# Patient Record
Sex: MALE | Race: WHITE | NOT HISPANIC OR LATINO | Employment: FULL TIME | URBAN - METROPOLITAN AREA
[De-identification: names, ages, dates, MRNs, and addresses within clinical notes are randomized per-mention and may not be internally consistent; named-entity substitution may affect disease eponyms.]

---

## 2018-11-21 ENCOUNTER — OFFICE VISIT (OUTPATIENT)
Dept: FAMILY MEDICINE CLINIC | Facility: HOSPITAL | Age: 38
End: 2018-11-21
Payer: COMMERCIAL

## 2018-11-21 VITALS
SYSTOLIC BLOOD PRESSURE: 138 MMHG | HEART RATE: 86 BPM | BODY MASS INDEX: 41.35 KG/M2 | HEIGHT: 71 IN | TEMPERATURE: 97.6 F | WEIGHT: 295.4 LBS | DIASTOLIC BLOOD PRESSURE: 82 MMHG

## 2018-11-21 DIAGNOSIS — R03.0 ELEVATED BLOOD PRESSURE READING WITHOUT DIAGNOSIS OF HYPERTENSION: ICD-10-CM

## 2018-11-21 DIAGNOSIS — F98.8 ATTENTION DEFICIT DISORDER (ADD) WITHOUT HYPERACTIVITY: ICD-10-CM

## 2018-11-21 DIAGNOSIS — Z13.228 SCREENING FOR ENDOCRINE/METABOLIC/IMMUNITY DISORDERS: ICD-10-CM

## 2018-11-21 DIAGNOSIS — R94.31 PROLONGED Q-T INTERVAL ON ECG: ICD-10-CM

## 2018-11-21 DIAGNOSIS — J45.40 MODERATE PERSISTENT ASTHMA WITHOUT COMPLICATION: Primary | ICD-10-CM

## 2018-11-21 DIAGNOSIS — Z13.220 SCREENING CHOLESTEROL LEVEL: ICD-10-CM

## 2018-11-21 DIAGNOSIS — Z13.29 SCREENING FOR ENDOCRINE/METABOLIC/IMMUNITY DISORDERS: ICD-10-CM

## 2018-11-21 DIAGNOSIS — J30.1 NON-SEASONAL ALLERGIC RHINITIS DUE TO POLLEN: ICD-10-CM

## 2018-11-21 DIAGNOSIS — Z13.0 SCREENING FOR ENDOCRINE/METABOLIC/IMMUNITY DISORDERS: ICD-10-CM

## 2018-11-21 PROCEDURE — 99203 OFFICE O/P NEW LOW 30 MIN: CPT | Performed by: NURSE PRACTITIONER

## 2018-11-21 PROCEDURE — 3008F BODY MASS INDEX DOCD: CPT | Performed by: NURSE PRACTITIONER

## 2018-11-21 PROCEDURE — 1036F TOBACCO NON-USER: CPT | Performed by: NURSE PRACTITIONER

## 2018-11-21 RX ORDER — MONTELUKAST SODIUM 10 MG/1
10 TABLET ORAL
Qty: 30 TABLET | Refills: 1 | Status: SHIPPED | OUTPATIENT
Start: 2018-11-21 | End: 2019-01-14 | Stop reason: SDUPTHER

## 2018-11-21 RX ORDER — LISDEXAMFETAMINE DIMESYLATE 70 MG/1
70 CAPSULE ORAL DAILY
Refills: 0 | COMMUNITY
Start: 2018-11-14 | End: 2022-04-19 | Stop reason: SDUPTHER

## 2018-11-21 RX ORDER — ALBUTEROL SULFATE 2.5 MG/3ML
2.5 SOLUTION RESPIRATORY (INHALATION) EVERY 6 HOURS PRN
COMMUNITY
End: 2018-11-21 | Stop reason: CLARIF

## 2018-11-21 RX ORDER — ALBUTEROL SULFATE 90 UG/1
2 AEROSOL, METERED RESPIRATORY (INHALATION) EVERY 6 HOURS PRN
Qty: 1 INHALER | Refills: 1 | Status: SHIPPED | OUTPATIENT
Start: 2018-11-21 | End: 2019-08-10 | Stop reason: SDUPTHER

## 2018-11-21 NOTE — ASSESSMENT & PLAN NOTE
Not well controlled but has not been on Dulera  Will refill Dulera and albuterol  Also will add Singulair with significant allergies which are contributing  Can continue with Allegra but w/o the decongestant  F/U in 1 month

## 2018-11-21 NOTE — ASSESSMENT & PLAN NOTE
BP high normal range  Long discussion about lifestyle changes  Recommend reading labels and keeping sodium < 1200 mg/daily, exercise, lose weight  Recommend getting home BP cuff and checking BP twice daily and record and brig to next OV  Also should avoid daily decongestant use  Doubtful Vyvanse is causing elevated BP since he has been on for 4 years  Likely more lifestyle factors  Screening labs ordered  F/U in 1 month for BP check and lab review

## 2018-11-21 NOTE — PROGRESS NOTES
Assessment/Plan: Moderate persistent asthma without complication  Not well controlled but has not been on Dulera  Will refill Dulera and albuterol  Also will add Singulair with significant allergies which are contributing  Can continue with Allegra but w/o the decongestant  F/U in 1 month  Elevated blood pressure reading without diagnosis of hypertension  BP high normal range  Long discussion about lifestyle changes  Recommend reading labels and keeping sodium < 1200 mg/daily, exercise, lose weight  Recommend getting home BP cuff and checking BP twice daily and record and brig to next OV  Also should avoid daily decongestant use  Doubtful Vyvanse is causing elevated BP since he has been on for 4 years  Likely more lifestyle factors  Screening labs ordered  F/U in 1 month for BP check and lab review  Prolonged Q-T interval on ECG  Incidental finding of prolonged QT interval    Referral to cardiology  ECG and office note will be faxed to psychiatrist DR Marte  Attention deficit disorder (ADD) without hyperactivity  Managed by Dr Melba Gomez (psychiatrist)  Non-seasonal allergic rhinitis due to pollen  Longstanding h/o allergies with h/o allergy injections  Singulair started and should continue with Allegra but w/o decongestant component  Diagnoses and all orders for this visit:    Moderate persistent asthma without complication  -     albuterol (PROAIR HFA) 90 mcg/act inhaler; Inhale 2 puffs every 6 (six) hours as needed for wheezing  -     mometasone-formoterol (DULERA) 200-5 MCG/ACT inhaler; Inhale 2 puffs 2 (two) times a day Rinse mouth after use  -     montelukast (SINGULAIR) 10 mg tablet;  Take 1 tablet (10 mg total) by mouth daily at bedtime    Elevated blood pressure reading without diagnosis of hypertension    Attention deficit disorder (ADD) without hyperactivity    Non-seasonal allergic rhinitis due to pollen    Prolonged Q-T interval on ECG  - Ambulatory referral to Cardiology; Future    Screening for endocrine/metabolic/immunity disorders  -     CBC and differential; Future  -     Comprehensive metabolic panel; Future  -     TSH, 3rd generation with Free T4 reflex; Future    Screening cholesterol level  -     Lipid panel; Future    Other orders  -     VYVANSE 70 MG capsule; Take 70 mg by mouth daily  -     Discontinue: albuterol (2 5 mg/3 mL) 0 083 % nebulizer solution; Take 2 5 mg by nebulization every 6 (six) hours as needed for wheezing or shortness of breath  -     Discontinue: mometasone-formoterol (DULERA) 200-5 MCG/ACT inhaler; Inhale 2 puffs 2 (two) times a day Rinse mouth after use  Subjective:      Patient ID: Nghia Louise is a 41 y o  male  New pt  Treated by psychiatrist for ADD with Vyvanse  BP was elevated in office  140/92  Wanted him to have ECG and be seen by PCP  Denies h/o HTN and has never been treated before  Has been on Vyvanse for 4 years  Drinks 1 cup coffee/day  No other caffeine  States he does not eat enough  Does admit to processed foods but does cook for himself  Does not exercise  Rare alcohol use  Denies street drug use  Increase in stress at work  Denies family h/o of HTN  Denies chest pain, palpitations, sob, HA, dizziness, blurry or double vision  Has asthma  Asthma has been controlled  Previously seen by allergy/asthma specialist but does not want to return  Has allergies which contribute to asthma  Has been tested  Dust, cats, horses, trees  etc Got allergy shots and did not work  Recently asthma has been worse  Chest tightness  Thinks it is allergies  Typically this is a problem this time of year  Exercise will trigger asthma symptoms  Has been taking Allegra-D daily and states this helps with his chest tightness  Denies coughing and wheezing  Was on [place] but has stopped  Was on Singulair a long time ago  Asthma   There is no cough, shortness of breath or wheezing   Pertinent negatives include no chest pain or headaches  His past medical history is significant for asthma  The following portions of the patient's history were reviewed and updated as appropriate: allergies, current medications, past family history, past medical history, past social history, past surgical history and problem list     Review of Systems   Eyes: Negative for visual disturbance  Respiratory: Positive for chest tightness  Negative for cough, shortness of breath and wheezing  Cardiovascular: Negative for chest pain, palpitations and leg swelling  Neurological: Negative for dizziness, weakness, light-headedness, numbness and headaches  Objective:  Vitals:    11/21/18 0859   BP: 138/82   Pulse: 86   Temp: 97 6 °F (36 4 °C)      Physical Exam   Constitutional: He is oriented to person, place, and time  He appears well-developed and well-nourished  Cardiovascular: Normal rate, regular rhythm and normal heart sounds  No murmur heard  No carotid bruit B/L  Pulmonary/Chest: Effort normal and breath sounds normal    Neurological: He is alert and oriented to person, place, and time  Skin: Skin is warm and dry  Psychiatric: He has a normal mood and affect   His behavior is normal  Judgment and thought content normal

## 2018-11-21 NOTE — ASSESSMENT & PLAN NOTE
Incidental finding of prolonged QT interval    Referral to cardiology  ECG and office note will be faxed to psychiatrist DR Marte

## 2018-11-21 NOTE — ASSESSMENT & PLAN NOTE
Longstanding h/o allergies with h/o allergy injections  Singulair started and should continue with Allegra but w/o decongestant component

## 2018-12-19 ENCOUNTER — OFFICE VISIT (OUTPATIENT)
Dept: CARDIOLOGY CLINIC | Facility: CLINIC | Age: 38
End: 2018-12-19
Payer: COMMERCIAL

## 2018-12-19 VITALS
WEIGHT: 302 LBS | DIASTOLIC BLOOD PRESSURE: 80 MMHG | HEIGHT: 71 IN | BODY MASS INDEX: 42.28 KG/M2 | HEART RATE: 66 BPM | SYSTOLIC BLOOD PRESSURE: 120 MMHG

## 2018-12-19 DIAGNOSIS — R94.31 PROLONGED Q-T INTERVAL ON ECG: Primary | ICD-10-CM

## 2018-12-19 PROCEDURE — 99214 OFFICE O/P EST MOD 30 MIN: CPT | Performed by: STUDENT IN AN ORGANIZED HEALTH CARE EDUCATION/TRAINING PROGRAM

## 2018-12-19 RX ORDER — FEXOFENADINE HCL 180 MG/1
180 TABLET ORAL AS NEEDED
COMMUNITY
End: 2020-06-02

## 2018-12-19 NOTE — PROGRESS NOTES
Cardiology Consultation     [facility]  [id number]  1980  HEART & VASCULAR Bhavana Pretty  [facility]  [address]      No diagnosis found  Discussion/Summary: Mr Paul Kenny is a 61-year-old male with past medical history significant for asthma, attention deficit was referred by PCP for prolonged QTc    1  Prolonged QTc  - Incidentally noted on EKG- reviewed from Nov 2018- Normal sinus rhythm  Normal ST T wave  Mildly prolonged QTC of 484    - No further cardiac work-up, patient is asymptomatic and able to exercise with no limitation or symptoms  No family history of sudden cardiac death   - Monitor when starting on QT prolonging medications- antibiotics or psychiatric medications  - discussed to get an EKG in a year with PCP  - patient started on Lisdexamfetamine (Vyvanse) for ADD- not known to cause prolonged QTc- okay to use  2  Asthma      F/u as needed  History of Present Illness: Mr Paul Kenny is a 61-year-old male with past medical history significant for asthma, attention deficit was referred by PCP for prolonged QTC  Patient has no prior cardiac history of arrhythmias, heart failure or valvular disease  No prior history of syncope or palpitations  He is very functional at baseline  He denies any symptoms of chest pain or pressure  No shortness of breath at rest or with exertion  No palpitations  No dizziness or lightheadedness  No presyncope or syncope  Exercises once or twice a week on stationary bike and able to do it with no limitation  No family history of CAD, arrhythmias, sudden cardiac death in any of the family members at age less than 36 or history of drowning or seizures  Works as an   No smoking history  Social alcohol use  No drug use  EKG from November 21st reviewed-normal sinus rhythm  Normal ST T wave  Mildly prolonged QTC of 484       Patient Active Problem List   Diagnosis    Moderate persistent asthma without complication    Attention deficit disorder (ADD) without hyperactivity    Elevated blood pressure reading without diagnosis of hypertension    Non-seasonal allergic rhinitis due to pollen    Prolonged Q-T interval on ECG     Past Medical History:   Diagnosis Date    Allergic     Asthma      Social History     Social History    Marital status: Single     Spouse name: N/A    Number of children: N/A    Years of education: N/A     Occupational History    Not on file  Social History Main Topics    Smoking status: Never Smoker    Smokeless tobacco: Never Used    Alcohol use No    Drug use: No    Sexual activity: Not on file     Other Topics Concern    Not on file     Social History Narrative    No narrative on file      Family History   Problem Relation Age of Onset    Cancer Family     Arthritis Family      Past Surgical History:   Procedure Laterality Date    HIP SURGERY      KNEE SURGERY         Current Outpatient Prescriptions:     albuterol (PROAIR HFA) 90 mcg/act inhaler, Inhale 2 puffs every 6 (six) hours as needed for wheezing, Disp: 1 Inhaler, Rfl: 1    fexofenadine (ALLEGRA) 180 MG tablet, Take 180 mg by mouth as needed, Disp: , Rfl:     mometasone-formoterol (DULERA) 200-5 MCG/ACT inhaler, Inhale 2 puffs 2 (two) times a day Rinse mouth after use , Disp: 1 Inhaler, Rfl: 1    montelukast (SINGULAIR) 10 mg tablet, Take 1 tablet (10 mg total) by mouth daily at bedtime, Disp: 30 tablet, Rfl: 1    VYVANSE 70 MG capsule, Take 70 mg by mouth daily, Disp: , Rfl: 0  No Known Allergies      Labs:  No visits with results within 6 Month(s) from this visit  Latest known visit with results is:   No results found for any previous visit  Imaging: No results found  ECG: November 21st reviewed-normal sinus rhythm  Normal ST T wave  Mildly prolonged QTC of 484       Review of Systems:  Review of Systems   Constitutional: Negative for activity change, appetite change, chills, diaphoresis, fatigue and fever  HENT: Negative for congestion  Respiratory: Negative for cough, chest tightness, shortness of breath and wheezing  Cardiovascular: Negative for chest pain, palpitations and leg swelling  Gastrointestinal: Negative for abdominal pain, diarrhea, nausea and vomiting  Genitourinary: Negative for difficulty urinating and dysuria  Musculoskeletal: Negative for back pain  Skin: Negative for color change and rash  Neurological: Negative for dizziness, syncope, facial asymmetry, weakness, light-headedness, numbness and headaches  Psychiatric/Behavioral: Negative for confusion           Vitals:    12/19/18 1456   BP: 120/80   BP Location: Right arm   Patient Position: Sitting   Cuff Size: Large   Pulse: 66   Weight: (!) 137 kg (302 lb)   Height: 5' 11" (1 803 m)     Vitals:    12/19/18 1456   Weight: (!) 137 kg (302 lb)     Height: 5' 11" (180 3 cm)     Physical Exam:  General appearance:  Appears stated age, alert, well appearing and in no distress  HEENT:  PERRLA, EOMI, no scleral icterus, no conjunctival pallor  NECK:  Supple, No elevated JVP, no thyromegaly, no carotid bruits  HEART:  Regular rate and rhythm, normal S1/S2, no S3/S4, no murmur or rub  LUNGS:  Clear to auscultation bilaterally  ABDOMEN:  Soft, non-tender, positive bowel sounds, no rebound or guarding, no organomegaly   EXTREMITIES:  No edema  VASCULAR:  Normal pedal pulses   SKIN: No lesions or rashes on exposed skin  NEURO:  CN II-XII intact, no focal deficits

## 2018-12-19 NOTE — LETTER
December 19, 2018     [address]  [address] [address]    Patient: Alison Deleon   YOB: 1980   Date of Visit: 12/19/2018       Dear Dr Melissa Sheets: Thank you for referring Alison Deleon to me for evaluation  Below are my notes for this consultation  If you have questions, please do not hesitate to call me  I look forward to following your patient along with you  Sincerely,        Kat Mota MD        CC: No Recipients  Kat Mota MD  12/19/2018  4:32 PM  Sign at close encounter    Cardiology Consultation     Alison Deleon  [id number]  1980  [address]      No diagnosis found  Discussion/Summary: Mr Marcos Child is a 47-year-old male with past medical history significant for asthma, attention deficit was referred by PCP for prolonged QTc    1  Prolonged QTc  - Incidentally noted on EKG- reviewed from Nov 2018- Normal sinus rhythm  Normal ST T wave  Mildly prolonged QTC of 484    - No further cardiac work-up, patient is asymptomatic and able to exercise with no limitation or symptoms  No family history of sudden cardiac death   - Monitor when starting on QT prolonging medications- antibiotics or psychiatric medications  - discussed to get an EKG in a year with PCP  - patient started on Lisdexamfetamine (Vyvanse) for ADD- not known to cause prolonged QTc- okay to use  2  Asthma      F/u as needed  History of Present Illness: Mr Marcos Child is a 47-year-old male with past medical history significant for asthma, attention deficit was referred by PCP for prolonged QTC  Patient has no prior cardiac history of arrhythmias, heart failure or valvular disease  No prior history of syncope or palpitations  He is very functional at baseline  He denies any symptoms of chest pain or pressure  No shortness of breath at rest or with exertion  No palpitations  No dizziness or lightheadedness  No presyncope or syncope  Exercises once or twice a week on stationary bike and able to do it with no limitation  No family history of CAD, arrhythmias, sudden cardiac death in any of the family members at age less than 36 or history of drowning or seizures  Works as an   No smoking history  Social alcohol use  No drug use  EKG from November 21st reviewed-normal sinus rhythm  Normal ST T wave  Mildly prolonged QTC of 484  Patient Active Problem List   Diagnosis    Moderate persistent asthma without complication    Attention deficit disorder (ADD) without hyperactivity    Elevated blood pressure reading without diagnosis of hypertension    Non-seasonal allergic rhinitis due to pollen    Prolonged Q-T interval on ECG     Past Medical History:   Diagnosis Date    Allergic     Asthma      Social History     Social History    Marital status: Single     Spouse name: N/A    Number of children: N/A    Years of education: N/A     Occupational History    Not on file       Social History Main Topics    Smoking status: Never Smoker    Smokeless tobacco: Never Used    Alcohol use No    Drug use: No    Sexual activity: Not on file     Other Topics Concern    Not on file     Social History Narrative    No narrative on file      Family History   Problem Relation Age of Onset    Cancer Family     Arthritis Family      Past Surgical History:   Procedure Laterality Date    HIP SURGERY      KNEE SURGERY         Current Outpatient Prescriptions:     albuterol (PROAIR HFA) 90 mcg/act inhaler, Inhale 2 puffs every 6 (six) hours as needed for wheezing, Disp: 1 Inhaler, Rfl: 1    fexofenadine (ALLEGRA) 180 MG tablet, Take 180 mg by mouth as needed, Disp: , Rfl:     mometasone-formoterol (DULERA) 200-5 MCG/ACT inhaler, Inhale 2 puffs 2 (two) times a day Rinse mouth after use , Disp: 1 Inhaler, Rfl: 1    montelukast (SINGULAIR) 10 mg tablet, Take 1 tablet (10 mg total) by mouth daily at bedtime, Disp: 30 tablet, Rfl: 1    VYVANSE 70 MG capsule, Take 70 mg by mouth daily, Disp: , Rfl: 0  No Known Allergies      Labs:  No visits with results within 6 Month(s) from this visit  Latest known visit with results is:   No results found for any previous visit  Imaging: No results found  ECG: November 21st reviewed-normal sinus rhythm  Normal ST T wave  Mildly prolonged QTC of 484  Review of Systems:  Review of Systems   Constitutional: Negative for activity change, appetite change, chills, diaphoresis, fatigue and fever  HENT: Negative for congestion  Respiratory: Negative for cough, chest tightness, shortness of breath and wheezing  Cardiovascular: Negative for chest pain, palpitations and leg swelling  Gastrointestinal: Negative for abdominal pain, diarrhea, nausea and vomiting  Genitourinary: Negative for difficulty urinating and dysuria  Musculoskeletal: Negative for back pain  Skin: Negative for color change and rash  Neurological: Negative for dizziness, syncope, facial asymmetry, weakness, light-headedness, numbness and headaches  Psychiatric/Behavioral: Negative for confusion           Vitals:    12/19/18 1456   BP: 120/80   BP Location: Right arm   Patient Position: Sitting   Cuff Size: Large   Pulse: 66   Weight: (!) 137 kg (302 lb)   Height: 5' 11" (1 803 m)     Vitals:    12/19/18 1456   Weight: (!) 137 kg (302 lb)     Height: 5' 11" (180 3 cm)     Physical Exam:  General appearance:  Appears stated age, alert, well appearing and in no distress  HEENT:  PERRLA, EOMI, no scleral icterus, no conjunctival pallor  NECK:  Supple, No elevated JVP, no thyromegaly, no carotid bruits  HEART:  Regular rate and rhythm, normal S1/S2, no S3/S4, no murmur or rub  LUNGS:  Clear to auscultation bilaterally  ABDOMEN:  Soft, non-tender, positive bowel sounds, no rebound or guarding, no organomegaly   EXTREMITIES:  No edema  VASCULAR:  Normal pedal pulses   SKIN: No lesions or rashes on exposed skin  NEURO:  CN II-XII intact, no focal deficits

## 2018-12-19 NOTE — PATIENT INSTRUCTIONS
The QTc is very slightly prolonged, this should not cause to have any problems to you  No further cardiac workup needed    Follow-up as needed

## 2019-01-14 DIAGNOSIS — J45.40 MODERATE PERSISTENT ASTHMA WITHOUT COMPLICATION: ICD-10-CM

## 2019-01-14 RX ORDER — MONTELUKAST SODIUM 10 MG/1
TABLET ORAL
Qty: 30 TABLET | Refills: 0 | Status: SHIPPED | OUTPATIENT
Start: 2019-01-14 | End: 2019-03-11 | Stop reason: SDUPTHER

## 2019-01-14 RX ORDER — MOMETASONE FUROATE AND FORMOTEROL FUMARATE DIHYDRATE 200; 5 UG/1; UG/1
2 AEROSOL RESPIRATORY (INHALATION) 2 TIMES DAILY
Qty: 1 INHALER | Refills: 0 | Status: SHIPPED | OUTPATIENT
Start: 2019-01-14 | End: 2019-03-10 | Stop reason: SDUPTHER

## 2019-01-19 ENCOUNTER — APPOINTMENT (OUTPATIENT)
Dept: LAB | Facility: HOSPITAL | Age: 39
End: 2019-01-19
Payer: COMMERCIAL

## 2019-01-19 DIAGNOSIS — Z13.0 SCREENING FOR ENDOCRINE/METABOLIC/IMMUNITY DISORDERS: ICD-10-CM

## 2019-01-19 DIAGNOSIS — Z13.29 SCREENING FOR ENDOCRINE/METABOLIC/IMMUNITY DISORDERS: ICD-10-CM

## 2019-01-19 DIAGNOSIS — Z13.228 SCREENING FOR ENDOCRINE/METABOLIC/IMMUNITY DISORDERS: ICD-10-CM

## 2019-01-19 DIAGNOSIS — Z13.220 SCREENING CHOLESTEROL LEVEL: ICD-10-CM

## 2019-01-19 LAB
ALBUMIN SERPL BCP-MCNC: 3.7 G/DL (ref 3.5–5)
ALP SERPL-CCNC: 70 U/L (ref 46–116)
ALT SERPL W P-5'-P-CCNC: 28 U/L (ref 12–78)
ANION GAP SERPL CALCULATED.3IONS-SCNC: 7 MMOL/L (ref 4–13)
AST SERPL W P-5'-P-CCNC: 14 U/L (ref 5–45)
BASOPHILS # BLD AUTO: 0.05 THOUSANDS/ΜL (ref 0–0.1)
BASOPHILS NFR BLD AUTO: 1 % (ref 0–1)
BILIRUB SERPL-MCNC: 0.5 MG/DL (ref 0.2–1)
BUN SERPL-MCNC: 14 MG/DL (ref 5–25)
CALCIUM SERPL-MCNC: 8.8 MG/DL (ref 8.3–10.1)
CHLORIDE SERPL-SCNC: 104 MMOL/L (ref 100–108)
CHOLEST SERPL-MCNC: 163 MG/DL (ref 50–200)
CO2 SERPL-SCNC: 28 MMOL/L (ref 21–32)
CREAT SERPL-MCNC: 1.07 MG/DL (ref 0.6–1.3)
EOSINOPHIL # BLD AUTO: 0.12 THOUSAND/ΜL (ref 0–0.61)
EOSINOPHIL NFR BLD AUTO: 2 % (ref 0–6)
ERYTHROCYTE [DISTWIDTH] IN BLOOD BY AUTOMATED COUNT: 12.7 % (ref 11.6–15.1)
GFR SERPL CREATININE-BSD FRML MDRD: 89 ML/MIN/1.73SQ M
GLUCOSE P FAST SERPL-MCNC: 95 MG/DL (ref 65–99)
HCT VFR BLD AUTO: 44.5 % (ref 36.5–49.3)
HDLC SERPL-MCNC: 32 MG/DL (ref 40–60)
HGB BLD-MCNC: 15 G/DL (ref 12–17)
IMM GRANULOCYTES # BLD AUTO: 0.02 THOUSAND/UL (ref 0–0.2)
IMM GRANULOCYTES NFR BLD AUTO: 0 % (ref 0–2)
LDLC SERPL CALC-MCNC: 107 MG/DL (ref 0–100)
LYMPHOCYTES # BLD AUTO: 2.71 THOUSANDS/ΜL (ref 0.6–4.47)
LYMPHOCYTES NFR BLD AUTO: 34 % (ref 14–44)
MCH RBC QN AUTO: 31 PG (ref 26.8–34.3)
MCHC RBC AUTO-ENTMCNC: 33.7 G/DL (ref 31.4–37.4)
MCV RBC AUTO: 92 FL (ref 82–98)
MONOCYTES # BLD AUTO: 0.52 THOUSAND/ΜL (ref 0.17–1.22)
MONOCYTES NFR BLD AUTO: 7 % (ref 4–12)
NEUTROPHILS # BLD AUTO: 4.64 THOUSANDS/ΜL (ref 1.85–7.62)
NEUTS SEG NFR BLD AUTO: 56 % (ref 43–75)
NONHDLC SERPL-MCNC: 131 MG/DL
NRBC BLD AUTO-RTO: 0 /100 WBCS
PLATELET # BLD AUTO: 270 THOUSANDS/UL (ref 149–390)
PMV BLD AUTO: 10.3 FL (ref 8.9–12.7)
POTASSIUM SERPL-SCNC: 4.2 MMOL/L (ref 3.5–5.3)
PROT SERPL-MCNC: 7.7 G/DL (ref 6.4–8.2)
RBC # BLD AUTO: 4.84 MILLION/UL (ref 3.88–5.62)
SODIUM SERPL-SCNC: 139 MMOL/L (ref 136–145)
TRIGL SERPL-MCNC: 122 MG/DL
TSH SERPL DL<=0.05 MIU/L-ACNC: 1.55 UIU/ML (ref 0.36–3.74)
WBC # BLD AUTO: 8.06 THOUSAND/UL (ref 4.31–10.16)

## 2019-01-19 PROCEDURE — 80053 COMPREHEN METABOLIC PANEL: CPT

## 2019-01-19 PROCEDURE — 36415 COLL VENOUS BLD VENIPUNCTURE: CPT

## 2019-01-19 PROCEDURE — 85025 COMPLETE CBC W/AUTO DIFF WBC: CPT

## 2019-01-19 PROCEDURE — 80061 LIPID PANEL: CPT

## 2019-01-19 PROCEDURE — 84443 ASSAY THYROID STIM HORMONE: CPT

## 2019-02-05 ENCOUNTER — OFFICE VISIT (OUTPATIENT)
Dept: FAMILY MEDICINE CLINIC | Facility: HOSPITAL | Age: 39
End: 2019-02-05
Payer: COMMERCIAL

## 2019-02-05 VITALS
DIASTOLIC BLOOD PRESSURE: 78 MMHG | HEART RATE: 76 BPM | HEIGHT: 71 IN | SYSTOLIC BLOOD PRESSURE: 128 MMHG | WEIGHT: 307.4 LBS | TEMPERATURE: 97.5 F | BODY MASS INDEX: 43.03 KG/M2

## 2019-02-05 DIAGNOSIS — R03.0 ELEVATED BLOOD PRESSURE READING WITHOUT DIAGNOSIS OF HYPERTENSION: ICD-10-CM

## 2019-02-05 DIAGNOSIS — J30.1 NON-SEASONAL ALLERGIC RHINITIS DUE TO POLLEN: ICD-10-CM

## 2019-02-05 DIAGNOSIS — J45.40 MODERATE PERSISTENT ASTHMA WITHOUT COMPLICATION: Primary | ICD-10-CM

## 2019-02-05 DIAGNOSIS — E78.6 LOW HDL (UNDER 40): ICD-10-CM

## 2019-02-05 PROCEDURE — 3008F BODY MASS INDEX DOCD: CPT | Performed by: NURSE PRACTITIONER

## 2019-02-05 PROCEDURE — 99214 OFFICE O/P EST MOD 30 MIN: CPT | Performed by: NURSE PRACTITIONER

## 2019-02-05 PROCEDURE — 1036F TOBACCO NON-USER: CPT | Performed by: NURSE PRACTITIONER

## 2019-02-06 NOTE — PROGRESS NOTES
Assessment/Plan: Moderate persistent asthma without complication  Well controlled on Dulera and Singulair  Can continue this regimen  Can back off on Dulera slowly but should restart with any flare in asthma symptoms  F/U in 6 months  Non-seasonal allergic rhinitis due to pollen  Continue on Singulair  Well controlled  Elevated blood pressure reading without diagnosis of hypertension  Last 2 BP readings have been well controlled  Reviewed lab work with pt  Low HDL (under 40)  HDL low with normal LDL and TG  Discussed increasing intake of omega-3 rich foods and increasing activity level  Recheck yearly  Diagnoses and all orders for this visit:    Moderate persistent asthma without complication    Elevated blood pressure reading without diagnosis of hypertension    Non-seasonal allergic rhinitis due to pollen    Low HDL (under 40)          Subjective:      Patient ID: Silvana Oneill is a 41 y o  male  Asthma has been well controlled  Currently with cold and does not feel asthma symptoms are bad  Denies cough, sob and wheezing  Taking Dulera and Singulair daily  Feels Singulair has made a big difference  Thinks allergies are worst in the fall  The following portions of the patient's history were reviewed and updated as appropriate: allergies, current medications, past family history, past medical history, past social history, past surgical history and problem list     Review of Systems   Eyes: Negative for visual disturbance  Respiratory: Negative for cough, shortness of breath and wheezing  Cardiovascular: Negative for chest pain and palpitations  Neurological: Negative for dizziness and headaches  Objective:  Vitals:    02/05/19 1905   BP: 128/78   Pulse: 76   Temp: 97 5 °F (36 4 °C)      Physical Exam   Constitutional: He is oriented to person, place, and time  He appears well-developed and well-nourished     Cardiovascular: Normal rate, regular rhythm and normal heart sounds  No murmur heard  Pulses:       Carotid pulses are 2+ on the right side, and 2+ on the left side  No carotid bruit B/L  Pulmonary/Chest: Effort normal and breath sounds normal    Neurological: He is alert and oriented to person, place, and time  Skin: Skin is warm and dry  Psychiatric: He has a normal mood and affect   His behavior is normal  Judgment and thought content normal

## 2019-02-06 NOTE — ASSESSMENT & PLAN NOTE
Well controlled on Dulera and Singulair  Can continue this regimen  Can back off on Dulera slowly but should restart with any flare in asthma symptoms  F/U in 6 months

## 2019-02-06 NOTE — ASSESSMENT & PLAN NOTE
HDL low with normal LDL and TG  Discussed increasing intake of omega-3 rich foods and increasing activity level  Recheck yearly

## 2019-03-10 DIAGNOSIS — J45.40 MODERATE PERSISTENT ASTHMA WITHOUT COMPLICATION: ICD-10-CM

## 2019-03-10 RX ORDER — MOMETASONE FUROATE AND FORMOTEROL FUMARATE DIHYDRATE 200; 5 UG/1; UG/1
2 AEROSOL RESPIRATORY (INHALATION) 2 TIMES DAILY
Qty: 3 INHALER | Refills: 1 | Status: SHIPPED | OUTPATIENT
Start: 2019-03-10 | End: 2019-10-21 | Stop reason: ALTCHOICE

## 2019-03-11 DIAGNOSIS — J45.40 MODERATE PERSISTENT ASTHMA WITHOUT COMPLICATION: ICD-10-CM

## 2019-03-12 RX ORDER — MONTELUKAST SODIUM 10 MG/1
TABLET ORAL
Qty: 90 TABLET | Refills: 1 | Status: SHIPPED | OUTPATIENT
Start: 2019-03-12 | End: 2019-10-07 | Stop reason: SDUPTHER

## 2019-04-12 ENCOUNTER — OFFICE VISIT (OUTPATIENT)
Dept: FAMILY MEDICINE CLINIC | Facility: HOSPITAL | Age: 39
End: 2019-04-12
Payer: COMMERCIAL

## 2019-04-12 VITALS
BODY MASS INDEX: 42.84 KG/M2 | WEIGHT: 306 LBS | SYSTOLIC BLOOD PRESSURE: 140 MMHG | HEIGHT: 71 IN | TEMPERATURE: 97.8 F | DIASTOLIC BLOOD PRESSURE: 88 MMHG | HEART RATE: 81 BPM

## 2019-04-12 DIAGNOSIS — J45.40 MODERATE PERSISTENT ASTHMA WITHOUT COMPLICATION: ICD-10-CM

## 2019-04-12 DIAGNOSIS — J06.9 UPPER RESPIRATORY TRACT INFECTION, UNSPECIFIED TYPE: Primary | ICD-10-CM

## 2019-04-12 PROCEDURE — 99214 OFFICE O/P EST MOD 30 MIN: CPT | Performed by: INTERNAL MEDICINE

## 2019-04-12 PROCEDURE — 1036F TOBACCO NON-USER: CPT | Performed by: INTERNAL MEDICINE

## 2019-04-12 PROCEDURE — 3008F BODY MASS INDEX DOCD: CPT | Performed by: INTERNAL MEDICINE

## 2019-04-12 RX ORDER — FLUTICASONE PROPIONATE 50 MCG
2 SPRAY, SUSPENSION (ML) NASAL DAILY
Qty: 1 BOTTLE | Refills: 1 | Status: SHIPPED | OUTPATIENT
Start: 2019-04-12 | End: 2020-07-16 | Stop reason: ALTCHOICE

## 2019-08-06 ENCOUNTER — OFFICE VISIT (OUTPATIENT)
Dept: FAMILY MEDICINE CLINIC | Facility: HOSPITAL | Age: 39
End: 2019-08-06
Payer: COMMERCIAL

## 2019-08-06 VITALS
DIASTOLIC BLOOD PRESSURE: 76 MMHG | TEMPERATURE: 97.8 F | HEIGHT: 71 IN | WEIGHT: 304.2 LBS | SYSTOLIC BLOOD PRESSURE: 126 MMHG | HEART RATE: 84 BPM | BODY MASS INDEX: 42.59 KG/M2

## 2019-08-06 DIAGNOSIS — E78.6 LOW HDL (UNDER 40): ICD-10-CM

## 2019-08-06 DIAGNOSIS — R06.83 SNORING: ICD-10-CM

## 2019-08-06 DIAGNOSIS — J45.40 MODERATE PERSISTENT ASTHMA WITHOUT COMPLICATION: Primary | ICD-10-CM

## 2019-08-06 DIAGNOSIS — E66.01 MORBID OBESITY WITH BMI OF 40.0-44.9, ADULT (HCC): ICD-10-CM

## 2019-08-06 DIAGNOSIS — R40.0 DAYTIME SLEEPINESS: ICD-10-CM

## 2019-08-06 DIAGNOSIS — Z13.1 SCREENING FOR DIABETES MELLITUS: ICD-10-CM

## 2019-08-06 PROCEDURE — 1036F TOBACCO NON-USER: CPT | Performed by: NURSE PRACTITIONER

## 2019-08-06 PROCEDURE — 3008F BODY MASS INDEX DOCD: CPT | Performed by: NURSE PRACTITIONER

## 2019-08-06 PROCEDURE — 99214 OFFICE O/P EST MOD 30 MIN: CPT | Performed by: NURSE PRACTITIONER

## 2019-08-06 NOTE — ASSESSMENT & PLAN NOTE
Well controlled off daily inhaler  Should have daily inhaler on hand  Advised pt to have insurance refax the suggested inhalers to this office  F/U in 6 months  Call with any increase in sob, wheezing, cough or albuterol use

## 2019-08-06 NOTE — PATIENT INSTRUCTIONS
Obesity   AMBULATORY CARE:   Obesity  is when your body mass index (BMI) is greater than 30  Your healthcare provider will use your height and weight to measure your BMI  The risks of obesity include  many health problems, such as injuries or physical disability  You may need tests to check for the following:  · Diabetes     · High blood pressure or high cholesterol     · Heart disease     · Gallbladder or liver disease     · Cancer of the colon, breast, prostate, liver, or kidney     · Sleep apnea     · Arthritis or gout  Seek care immediately if:   · You have a severe headache, confusion, or difficulty speaking  · You have weakness on one side of your body  · You have chest pain, sweating, or shortness of breath  Contact your healthcare provider if:   · You have symptoms of gallbladder or liver disease, such as pain in your upper abdomen  · You have knee or hip pain and discomfort while walking  · You have symptoms of diabetes, such as intense hunger and thirst, and frequent urination  · You have symptoms of sleep apnea, such as snoring or daytime sleepiness  · You have questions or concerns about your condition or care  Treatment for obesity  focuses on helping you lose weight to improve your health  Even a small decrease in BMI can reduce the risk for many health problems  Your healthcare provider will help you set a weight-loss goal   · Lifestyle changes  are the first step in treating obesity  These include making healthy food choices and getting regular physical activity  Your healthcare provider may suggest a weight-loss program that involves coaching, education, and therapy  · Medicine  may help you lose weight when it is used with a healthy diet and physical activity  · Surgery  can help you lose weight if you are very obese and have other health problems  There are several types of weight-loss surgery  Ask your healthcare provider for more information    Be successful losing weight:   · Set small, realistic goals  An example of a small goal is to walk for 20 minutes 5 days a week  Anther goal is to lose 5% of your body weight  · Tell friends, family members, and coworkers about your goals  and ask for their support  Ask a friend to lose weight with you, or join a weight-loss support group  · Identify foods or triggers that may cause you to overeat , and find ways to avoid them  Remove tempting high-calorie foods from your home and workplace  Place a bowl of fresh fruit on your kitchen counter  If stress causes you to eat, then find other ways to cope with stress  · Keep a diary to track what you eat and drink  Also write down how many minutes of physical activity you do each day  Weigh yourself once a week and record it in your diary  Eating changes: You will need to eat 500 to 1,000 fewer calories each day than you currently eat to lose 1 to 2 pounds a week  The following changes will help you cut calories:  · Eat smaller portions  Use small plates, no larger than 9 inches in diameter  Fill your plate half full of fruits and vegetables  Measure your food using measuring cups until you know what a serving size looks like  · Eat 3 meals and 1 or 2 snacks each day  Plan your meals in advance  Dariela Matt and eat at home most of the time  Eat slowly  · Eat fruits and vegetables at every meal   They are low in calories and high in fiber, which makes you feel full  Do not add butter, margarine, or cream sauce to vegetables  Use herbs to season steamed vegetables  · Eat less fat and fewer fried foods  Eat more baked or grilled chicken and fish  These protein sources are lower in calories and fat than red meat  Limit fast food  Dress your salads with olive oil and vinegar instead of bottled dressing  · Limit the amount of sugar you eat  Do not drink sugary beverages  Limit alcohol  Activity changes:  Physical activity is good for your body in many ways   It helps you burn calories and build strong muscles  It decreases stress and depression, and improves your mood  It can also help you sleep better  Talk to your healthcare provider before you begin an exercise program   · Exercise for at least 30 minutes 5 days a week  Start slowly  Set aside time each day for physical activity that you enjoy and that is convenient for you  It is best to do both weight training and an activity that increases your heart rate, such as walking, bicycling, or swimming  · Find ways to be more active  Do yard work and housecleaning  Walk up the stairs instead of using elevators  Spend your leisure time going to events that require walking, such as outdoor festivals or fairs  This extra physical activity can help you lose weight and keep it off  Follow up with your healthcare provider as directed: You may need to meet with a dietitian  Write down your questions so you remember to ask them during your visits  © 2017 [employer] Information is for End User's use only and may not be sold, redistributed or otherwise used for commercial purposes  All illustrations and images included in CareNotes® are the copyrighted property of [employer]  or [employer]ugio Santos  The above information is an  only  It is not intended as medical advice for individual conditions or treatments  Talk to your doctor, nurse or pharmacist before following any medical regimen to see if it is safe and effective for you

## 2019-08-06 NOTE — PROGRESS NOTES
Assessment/Plan: Moderate persistent asthma without complication  Well controlled off daily inhaler  Should have daily inhaler on hand  Advised pt to have insurance refax the suggested inhalers to this office  F/U in 6 months  Call with any increase in sob, wheezing, cough or albuterol use  Low HDL (under 40)  Recheck FLP in 6 months  Diagnoses and all orders for this visit:    Moderate persistent asthma without complication    Low HDL (under 40)  -     CBC and differential; Future  -     Comprehensive metabolic panel; Future  -     Lipid panel; Future    Screening for diabetes mellitus  -     Hemoglobin A1C; Future    Snoring  Comments:  Referral to sleep medicine  Orders:  -     Ambulatory referral to Sleep Medicine; Future    Daytime sleepiness  -     Ambulatory referral to Sleep Medicine; Future          Subjective:      Patient ID: Sherryle Garner is a 41 y o  male  Off [place]  Had to stop because he changed insurance and they did not cover Jose Roberto Endo  Breathing has been good  Did have to use albuterol twice last week due to allergies  States breathing much improved with Singulair  States he is told he snores  Has been feeling less rested and more tired despite adequate sleep  Has been going on for years  Denies report of apnea or known apnea  The following portions of the patient's history were reviewed and updated as appropriate: allergies, current medications, past family history, past medical history, past social history, past surgical history and problem list     Review of Systems   Respiratory: Negative for cough, chest tightness, shortness of breath and wheezing  Cardiovascular: Negative for chest pain  Objective:  Vitals:    08/06/19 1853   BP: 126/76   Pulse: 84   Temp: 97 8 °F (36 6 °C)      Physical Exam   Constitutional: He is oriented to person, place, and time  He appears well-developed and well-nourished     HENT:   Mouth/Throat: Oropharynx is clear and moist  Oropharynx non crowded  Cardiovascular: Normal rate, regular rhythm and normal heart sounds  No murmur heard  Pulmonary/Chest: Effort normal and breath sounds normal    Neck circumference 46 5 cm   Neurological: He is alert and oriented to person, place, and time  Skin: Skin is warm and dry  Capillary refill takes less than 2 seconds  Psychiatric: He has a normal mood and affect  BMI Counseling: Body mass index is 42 43 kg/m²  Discussed the patient's BMI with him  The BMI is above average  BMI counseling and education was provided to the patient  Nutrition recommendations include reducing portion sizes, decreasing overall calorie intake, consuming healthier snacks, decreasing soda and/or juice intake, moderation in carbohydrate intake and increasing intake of lean protein  Exercise recommendations include moderate aerobic physical activity for 150 minutes/week

## 2019-08-10 DIAGNOSIS — J45.40 MODERATE PERSISTENT ASTHMA WITHOUT COMPLICATION: ICD-10-CM

## 2019-08-11 RX ORDER — ALBUTEROL SULFATE 90 UG/1
AEROSOL, METERED RESPIRATORY (INHALATION)
Qty: 8.5 INHALER | Refills: 1 | Status: SHIPPED | OUTPATIENT
Start: 2019-08-11 | End: 2019-12-14 | Stop reason: SDUPTHER

## 2019-10-07 DIAGNOSIS — J45.40 MODERATE PERSISTENT ASTHMA WITHOUT COMPLICATION: ICD-10-CM

## 2019-10-07 RX ORDER — MONTELUKAST SODIUM 10 MG/1
TABLET ORAL
Qty: 90 TABLET | Refills: 1 | Status: SHIPPED | OUTPATIENT
Start: 2019-10-07 | End: 2020-09-01

## 2019-10-21 ENCOUNTER — TELEPHONE (OUTPATIENT)
Dept: FAMILY MEDICINE CLINIC | Facility: HOSPITAL | Age: 39
End: 2019-10-21

## 2019-10-21 DIAGNOSIS — J45.40 MODERATE PERSISTENT ASTHMA WITHOUT COMPLICATION: Primary | ICD-10-CM

## 2019-10-21 RX ORDER — BUDESONIDE AND FORMOTEROL FUMARATE DIHYDRATE 80; 4.5 UG/1; UG/1
2 AEROSOL RESPIRATORY (INHALATION) 2 TIMES DAILY
Qty: 1 INHALER | Refills: 3 | Status: SHIPPED | OUTPATIENT
Start: 2019-10-21 | End: 2020-03-04

## 2019-12-14 DIAGNOSIS — J45.40 MODERATE PERSISTENT ASTHMA WITHOUT COMPLICATION: ICD-10-CM

## 2019-12-15 RX ORDER — ALBUTEROL SULFATE 90 UG/1
AEROSOL, METERED RESPIRATORY (INHALATION)
Qty: 8.5 INHALER | Refills: 1 | Status: SHIPPED | OUTPATIENT
Start: 2019-12-15 | End: 2020-07-16 | Stop reason: SDUPTHER

## 2020-03-04 DIAGNOSIS — J45.40 MODERATE PERSISTENT ASTHMA WITHOUT COMPLICATION: ICD-10-CM

## 2020-03-04 RX ORDER — DILTIAZEM HYDROCHLORIDE 60 MG/1
TABLET, FILM COATED ORAL
Qty: 10.2 INHALER | Refills: 0 | Status: SHIPPED | OUTPATIENT
Start: 2020-03-04 | End: 2020-04-05

## 2020-04-04 DIAGNOSIS — J45.40 MODERATE PERSISTENT ASTHMA WITHOUT COMPLICATION: ICD-10-CM

## 2020-04-05 RX ORDER — DILTIAZEM HYDROCHLORIDE 60 MG/1
TABLET, FILM COATED ORAL
Qty: 10.2 INHALER | Refills: 0 | Status: SHIPPED | OUTPATIENT
Start: 2020-04-05 | End: 2020-05-04

## 2020-05-04 DIAGNOSIS — J45.40 MODERATE PERSISTENT ASTHMA WITHOUT COMPLICATION: ICD-10-CM

## 2020-05-04 RX ORDER — DILTIAZEM HYDROCHLORIDE 60 MG/1
TABLET, FILM COATED ORAL
Qty: 10.2 INHALER | Refills: 0 | Status: SHIPPED | OUTPATIENT
Start: 2020-05-04 | End: 2022-04-12 | Stop reason: SDUPTHER

## 2020-06-01 ENCOUNTER — TELEPHONE (OUTPATIENT)
Dept: OBGYN CLINIC | Facility: HOSPITAL | Age: 40
End: 2020-06-01

## 2020-06-01 DIAGNOSIS — J45.40 MODERATE PERSISTENT ASTHMA WITHOUT COMPLICATION: ICD-10-CM

## 2020-06-01 RX ORDER — DILTIAZEM HYDROCHLORIDE 60 MG/1
TABLET, FILM COATED ORAL
Qty: 10.2 INHALER | Refills: 0 | OUTPATIENT
Start: 2020-06-01

## 2020-06-01 NOTE — TELEPHONE ENCOUNTER
Pt was told in March that I would not refill inhaler until he is seen  He was supposed to call back  He needs appointment and to be seen for further refills

## 2020-06-02 ENCOUNTER — OFFICE VISIT (OUTPATIENT)
Dept: OBGYN CLINIC | Facility: CLINIC | Age: 40
End: 2020-06-02
Payer: COMMERCIAL

## 2020-06-02 ENCOUNTER — APPOINTMENT (OUTPATIENT)
Dept: RADIOLOGY | Facility: CLINIC | Age: 40
End: 2020-06-02
Payer: COMMERCIAL

## 2020-06-02 VITALS
SYSTOLIC BLOOD PRESSURE: 168 MMHG | BODY MASS INDEX: 42.84 KG/M2 | DIASTOLIC BLOOD PRESSURE: 98 MMHG | HEART RATE: 106 BPM | HEIGHT: 71 IN | WEIGHT: 306 LBS

## 2020-06-02 DIAGNOSIS — M16.11 PRIMARY OSTEOARTHRITIS OF RIGHT HIP: Primary | ICD-10-CM

## 2020-06-02 DIAGNOSIS — M25.551 PAIN IN RIGHT HIP: ICD-10-CM

## 2020-06-02 PROCEDURE — 99203 OFFICE O/P NEW LOW 30 MIN: CPT | Performed by: ORTHOPAEDIC SURGERY

## 2020-06-02 PROCEDURE — 3008F BODY MASS INDEX DOCD: CPT | Performed by: ORTHOPAEDIC SURGERY

## 2020-06-02 PROCEDURE — 1036F TOBACCO NON-USER: CPT | Performed by: ORTHOPAEDIC SURGERY

## 2020-06-02 PROCEDURE — 73502 X-RAY EXAM HIP UNI 2-3 VIEWS: CPT

## 2020-06-02 RX ORDER — MELOXICAM 15 MG/1
15 TABLET ORAL DAILY
Qty: 30 TABLET | Refills: 0 | Status: SHIPPED | OUTPATIENT
Start: 2020-06-02 | End: 2020-06-29

## 2020-06-04 ENCOUNTER — TELEPHONE (OUTPATIENT)
Dept: RADIOLOGY | Facility: CLINIC | Age: 40
End: 2020-06-04

## 2020-06-04 DIAGNOSIS — M16.11 PRIMARY OSTEOARTHRITIS OF ONE HIP, RIGHT: Primary | ICD-10-CM

## 2020-06-29 DIAGNOSIS — M16.11 PRIMARY OSTEOARTHRITIS OF RIGHT HIP: ICD-10-CM

## 2020-06-29 RX ORDER — MELOXICAM 15 MG/1
TABLET ORAL
Qty: 30 TABLET | Refills: 0 | Status: SHIPPED | OUTPATIENT
Start: 2020-06-29 | End: 2022-04-07 | Stop reason: ALTCHOICE

## 2020-07-13 ENCOUNTER — HOSPITAL ENCOUNTER (OUTPATIENT)
Dept: RADIOLOGY | Facility: CLINIC | Age: 40
Discharge: HOME | End: 2020-07-13
Attending: ANESTHESIOLOGY
Payer: COMMERCIAL

## 2020-07-13 VITALS
SYSTOLIC BLOOD PRESSURE: 129 MMHG | DIASTOLIC BLOOD PRESSURE: 77 MMHG | OXYGEN SATURATION: 96 % | TEMPERATURE: 98.1 F | HEART RATE: 86 BPM | RESPIRATION RATE: 20 BRPM

## 2020-07-13 DIAGNOSIS — M16.11 PRIMARY OSTEOARTHRITIS OF ONE HIP, RIGHT: ICD-10-CM

## 2020-07-13 PROCEDURE — 77002 NEEDLE LOCALIZATION BY XRAY: CPT

## 2020-07-13 PROCEDURE — 20610 DRAIN/INJ JOINT/BURSA W/O US: CPT | Performed by: ANESTHESIOLOGY

## 2020-07-13 PROCEDURE — 77002 NEEDLE LOCALIZATION BY XRAY: CPT | Performed by: ANESTHESIOLOGY

## 2020-07-13 RX ORDER — LIDOCAINE HYDROCHLORIDE 10 MG/ML
5 INJECTION, SOLUTION EPIDURAL; INFILTRATION; INTRACAUDAL; PERINEURAL ONCE
Status: COMPLETED | OUTPATIENT
Start: 2020-07-13 | End: 2020-07-13

## 2020-07-13 RX ORDER — METHYLPREDNISOLONE ACETATE 80 MG/ML
80 INJECTION, SUSPENSION INTRA-ARTICULAR; INTRALESIONAL; INTRAMUSCULAR; PARENTERAL; SOFT TISSUE ONCE
Status: COMPLETED | OUTPATIENT
Start: 2020-07-13 | End: 2020-07-13

## 2020-07-13 RX ADMIN — LIDOCAINE HYDROCHLORIDE 3 ML: 10 INJECTION, SOLUTION EPIDURAL; INFILTRATION; INTRACAUDAL; PERINEURAL at 09:15

## 2020-07-13 RX ADMIN — IOHEXOL 1 ML: 300 INJECTION, SOLUTION INTRAVENOUS at 09:17

## 2020-07-13 RX ADMIN — LIDOCAINE HYDROCHLORIDE 2 ML: 20 INJECTION, SOLUTION EPIDURAL; INFILTRATION; INTRACAUDAL at 09:18

## 2020-07-13 RX ADMIN — METHYLPREDNISOLONE ACETATE 80 MG: 80 INJECTION, SUSPENSION INTRA-ARTICULAR; INTRALESIONAL; INTRAMUSCULAR; SOFT TISSUE at 09:20

## 2020-07-13 NOTE — H&P
History of Present Illness: The patient is a 42 y o  male who presents with complaints of right hip pain      Patient Active Problem List   Diagnosis    Moderate persistent asthma without complication    Attention deficit disorder (ADD) without hyperactivity    Elevated blood pressure reading without diagnosis of hypertension    Non-seasonal allergic rhinitis due to pollen    Prolonged Q-T interval on ECG    Low HDL (under 40)    Primary osteoarthritis of right hip       Past Medical History:   Diagnosis Date    Allergic        Past Surgical History:   Procedure Laterality Date    HIP SURGERY      KNEE SURGERY           Current Outpatient Medications:     albuterol (PROVENTIL HFA,VENTOLIN HFA) 90 mcg/act inhaler, TAKE 2 PUFFS BY MOUTH EVERY 6 HOURS AS NEEDED FOR WHEEZE, Disp: 8 5 Inhaler, Rfl: 1    fluticasone (FLONASE) 50 mcg/act nasal spray, 2 sprays into each nostril daily, Disp: 1 Bottle, Rfl: 1    meloxicam (MOBIC) 15 mg tablet, TAKE 1 TABLET BY MOUTH EVERY DAY, Disp: 30 tablet, Rfl: 0    montelukast (SINGULAIR) 10 mg tablet, TAKE 1 TABLET BY MOUTH EVERYDAY AT BEDTIME, Disp: 90 tablet, Rfl: 1    SYMBICORT 80-4 5 MCG/ACT inhaler, INHALE 2 PUFFS 2 TIMES A DAY RINSE MOUTH AFTER USE , Disp: 10 2 Inhaler, Rfl: 0    VYVANSE 70 MG capsule, Take 70 mg by mouth daily, Disp: , Rfl: 0    Current Facility-Administered Medications:     iohexol (OMNIPAQUE) 300 mg/mL injection 50 mL, 50 mL, Intra-articular, Once, Brennan Philip DO    lidocaine (PF) (XYLOCAINE-MPF) 1 % injection 5 mL, 5 mL, Other, Once, Brennan Philip DO    lidocaine (PF) (XYLOCAINE-MPF) 2 % injection 5 mL, 5 mL, Intra-articular, Once, Brennan Philip DO    methylPREDNISolone acetate (DEPO-MEDROL) injection 80 mg, 80 mg, Intra-articular, Once, Brennan Philip DO    No Known Allergies    Physical Exam:   Vitals:    07/13/20 0903   BP: 122/76   Pulse: 84   Resp: 22   Temp: 98 1 °F (36 7 °C)   SpO2: 98%     General: Awake, Alert, Oriented x 3, Mood and affect appropriate  Respiratory: Respirations even and unlabored  Cardiovascular: Peripheral pulses intact; no edema  Musculoskeletal Exam:  Decreased range of motion right hip    ASA Score: II    Patient/Chart Verification  Patient ID Verified: Verbal  ID Band Applied: No  Consents Confirmed: Procedural  H&P( within 30 days) Verified: To be obtained in the Pre-Procedure area  Interval H&P(within 24 hr) Complete (required for Outpatients and Surgery Admit only): To be obtained in the Pre-Procedure area  Allergies Reviewed: Yes  Anticoag/NSAID held?: No  Currently on antibiotics?: No  Pre-op Lab/Test Results Available: N/A    Assessment:   1   Primary osteoarthritis of one hip, right        Plan: Right intra articular hip injection

## 2020-07-16 ENCOUNTER — OFFICE VISIT (OUTPATIENT)
Dept: FAMILY MEDICINE CLINIC | Facility: HOSPITAL | Age: 40
End: 2020-07-16
Payer: COMMERCIAL

## 2020-07-16 VITALS
TEMPERATURE: 97.4 F | OXYGEN SATURATION: 98 % | WEIGHT: 298.4 LBS | DIASTOLIC BLOOD PRESSURE: 74 MMHG | HEART RATE: 78 BPM | HEIGHT: 71 IN | BODY MASS INDEX: 41.77 KG/M2 | SYSTOLIC BLOOD PRESSURE: 128 MMHG

## 2020-07-16 DIAGNOSIS — Z23 ENCOUNTER FOR IMMUNIZATION: ICD-10-CM

## 2020-07-16 DIAGNOSIS — Z00.00 ANNUAL PHYSICAL EXAM: Primary | ICD-10-CM

## 2020-07-16 DIAGNOSIS — J45.40 MODERATE PERSISTENT ASTHMA WITHOUT COMPLICATION: ICD-10-CM

## 2020-07-16 PROBLEM — R03.0 ELEVATED BLOOD PRESSURE READING WITHOUT DIAGNOSIS OF HYPERTENSION: Status: RESOLVED | Noted: 2018-11-21 | Resolved: 2020-07-16

## 2020-07-16 PROCEDURE — 90715 TDAP VACCINE 7 YRS/> IM: CPT | Performed by: NURSE PRACTITIONER

## 2020-07-16 PROCEDURE — 3008F BODY MASS INDEX DOCD: CPT | Performed by: NURSE PRACTITIONER

## 2020-07-16 PROCEDURE — 90472 IMMUNIZATION ADMIN EACH ADD: CPT | Performed by: NURSE PRACTITIONER

## 2020-07-16 PROCEDURE — 99395 PREV VISIT EST AGE 18-39: CPT | Performed by: NURSE PRACTITIONER

## 2020-07-16 PROCEDURE — 90471 IMMUNIZATION ADMIN: CPT | Performed by: NURSE PRACTITIONER

## 2020-07-16 PROCEDURE — 90732 PPSV23 VACC 2 YRS+ SUBQ/IM: CPT | Performed by: NURSE PRACTITIONER

## 2020-07-16 RX ORDER — ALBUTEROL SULFATE 90 UG/1
2 AEROSOL, METERED RESPIRATORY (INHALATION) EVERY 4 HOURS PRN
Qty: 8.5 INHALER | Refills: 1 | Status: SHIPPED | OUTPATIENT
Start: 2020-07-16 | End: 2021-01-08 | Stop reason: SDUPTHER

## 2020-07-16 NOTE — PATIENT INSTRUCTIONS
Wellness Visit for Adults   AMBULATORY CARE:   A wellness visit  is when you see your healthcare provider to get screened for health problems  You can also get advice on how to stay healthy  Write down your questions so you remember to ask them  Ask your healthcare provider how often you should have a wellness visit  What happens at a wellness visit:  Your healthcare provider will ask about your health, and your family history of health problems  This includes high blood pressure, heart disease, and cancer  He or she will ask if you have symptoms that concern you, if you smoke, and about your mood  You may also be asked about your intake of medicines, supplements, food, and alcohol  Any of the following may be done:  · Your weight  will be checked  Your height may also be checked so your body mass index (BMI) can be calculated  Your BMI shows if you are at a healthy weight  · Your blood pressure  and heart rate will be checked  Your temperature may also be checked  · Blood and urine tests  may be done  Blood tests may be done to check your cholesterol levels  Abnormal cholesterol levels increase your risk for heart disease and stroke  You may also need a blood or urine test to check for diabetes if you are at increased risk  Urine tests may be done to look for signs of an infection or kidney disease  · A physical exam  includes checking your heartbeat and lungs with a stethoscope  Your healthcare provider may also check your skin to look for sun damage  · Screening tests  may be recommended  A screening test is done to check for diseases that may not cause symptoms  The screening tests you may need depend on your age, gender, family history, and lifestyle habits  For example, colorectal screening may be recommended if you are 48years old or older  Screening tests you need if you are a woman:   · A Pap smear  is used to screen for cervical cancer   Pap smears are usually done every 3 to 5 years depending on your age  You may need them more often if you have had abnormal Pap smear test results in the past  Ask your healthcare provider how often you should have a Pap smear  · A mammogram  is an x-ray of your breasts to screen for breast cancer  Experts recommend mammograms every 2 years starting at age 48 years  You may need a mammogram at age 52 years or younger if you have an increased risk for breast cancer  Talk to your healthcare provider about when you should start having mammograms and how often you need them  Vaccines you may need:   · Get an influenza vaccine  every year  The influenza vaccine protects you from the flu  Several types of viruses cause the flu  The viruses change over time, so new vaccines are made each year  · Get a tetanus-diphtheria (Td) booster vaccine  every 10 years  This vaccine protects you against tetanus and diphtheria  Tetanus is a severe infection that may cause painful muscle spasms and lockjaw  Diphtheria is a severe bacterial infection that causes a thick covering in the back of your mouth and throat  · Get a human papillomavirus (HPV) vaccine  if you are female and aged 23 to 32 or male 23 to 24 and never received it  This vaccine protects you from HPV infection  HPV is the most common infection spread by sexual contact  HPV may also cause vaginal, penile, and anal cancers  · Get a pneumococcal vaccine  if you are aged 72 years or older  The pneumococcal vaccine is an injection given to protect you from pneumococcal disease  Pneumococcal disease is an infection caused by pneumococcal bacteria  The infection may cause pneumonia, meningitis, or an ear infection  · Get a shingles vaccine  if you are aged 61 or older, even if you have had shingles before  The shingles vaccine is an injection to protect you from the varicella-zoster virus  This is the same virus that causes chickenpox   Shingles is a painful rash that develops in people who had chickenpox or have been exposed to the virus  How to eat healthy:  My Plate is a model for planning healthy meals  It shows the types and amounts of foods that should go on your plate  Fruits and vegetables make up about half of your plate, and grains and protein make up the other half  A serving of dairy is included on the side of your plate  The amount of calories and serving sizes you need depends on your age, gender, weight, and height  Examples of healthy foods are listed below:  · Eat a variety of vegetables  such as dark green, red, and orange vegetables  You can also include canned vegetables low in sodium (salt) and frozen vegetables without added butter or sauces  · Eat a variety of fresh fruits , canned fruit in 100% juice, frozen fruit, and dried fruit  · Include whole grains  At least half of the grains you eat should be whole grains  Examples include whole-wheat bread, wheat pasta, brown rice, and whole-grain cereals such as oatmeal     · Eat a variety of protein foods such as seafood (fish and shellfish), lean meat, and poultry without skin (turkey and chicken)  Examples of lean meats include pork leg, shoulder, or tenderloin, and beef round, sirloin, tenderloin, and extra lean ground beef  Other protein foods include eggs and egg substitutes, beans, peas, soy products, nuts, and seeds  · Choose low-fat dairy products such as skim or 1% milk or low-fat yogurt, cheese, and cottage cheese  · Limit unhealthy fats  such as butter, hard margarine, and shortening  Exercise:  Exercise at least 30 minutes per day on most days of the week  Some examples of exercise include walking, biking, dancing, and swimming  You can also fit in more physical activity by taking the stairs instead of the elevator or parking farther away from stores  Include muscle strengthening activities 2 days each week  Regular exercise provides many health benefits   It helps you manage your weight, and decreases your risk for type 2 diabetes, heart disease, stroke, and high blood pressure  Exercise can also help improve your mood  Ask your healthcare provider about the best exercise plan for you  General health and safety guidelines:   · Do not smoke  Nicotine and other chemicals in cigarettes and cigars can cause lung damage  Ask your healthcare provider for information if you currently smoke and need help to quit  E-cigarettes or smokeless tobacco still contain nicotine  Talk to your healthcare provider before you use these products  · Limit alcohol  A drink of alcohol is 12 ounces of beer, 5 ounces of wine, or 1½ ounces of liquor  · Lose weight, if needed  Being overweight increases your risk of certain health conditions  These include heart disease, high blood pressure, type 2 diabetes, and certain types of cancer  · Protect your skin  Do not sunbathe or use tanning beds  Use sunscreen with a SPF 15 or higher  Apply sunscreen at least 15 minutes before you go outside  Reapply sunscreen every 2 hours  Wear protective clothing, hats, and sunglasses when you are outside  · Drive safely  Always wear your seatbelt  Make sure everyone in your car wears a seatbelt  A seatbelt can save your life if you are in an accident  Do not use your cell phone when you are driving  This could distract you and cause an accident  Pull over if you need to make a call or send a text message  · Practice safe sex  Use latex condoms if are sexually active and have more than one partner  Your healthcare provider may recommend screening tests for sexually transmitted infections (STIs)  · Wear helmets, lifejackets, and protective gear  Always wear a helmet when you ride a bike or motorcycle, go skiing, or play sports that could cause a head injury  Wear protective equipment when you play sports  Wear a lifejacket when you are on a boat or doing water sports    © 2017 [employer] Information is for End User's use only and may not be sold, redistributed or otherwise used for commercial purposes  All illustrations and images included in CareNotes® are the copyrighted property of [employer] , [employer]  or Refugio Santos  The above information is an  only  It is not intended as medical advice for individual conditions or treatments  Talk to your doctor, nurse or pharmacist before following any medical regimen to see if it is safe and effective for you  Obesity   AMBULATORY CARE:   Obesity  is when your body mass index (BMI) is greater than 30  Your healthcare provider will use your height and weight to measure your BMI  The risks of obesity include  many health problems, such as injuries or physical disability  You may need tests to check for the following:  · Diabetes     · High blood pressure or high cholesterol     · Heart disease     · Gallbladder or liver disease     · Cancer of the colon, breast, prostate, liver, or kidney     · Sleep apnea     · Arthritis or gout  Seek care immediately if:   · You have a severe headache, confusion, or difficulty speaking  · You have weakness on one side of your body  · You have chest pain, sweating, or shortness of breath  Contact your healthcare provider if:   · You have symptoms of gallbladder or liver disease, such as pain in your upper abdomen  · You have knee or hip pain and discomfort while walking  · You have symptoms of diabetes, such as intense hunger and thirst, and frequent urination  · You have symptoms of sleep apnea, such as snoring or daytime sleepiness  · You have questions or concerns about your condition or care  Treatment for obesity  focuses on helping you lose weight to improve your health  Even a small decrease in BMI can reduce the risk for many health problems  Your healthcare provider will help you set a weight-loss goal   · Lifestyle changes  are the first step in treating obesity   These include making healthy food choices and getting regular physical activity  Your healthcare provider may suggest a weight-loss program that involves coaching, education, and therapy  · Medicine  may help you lose weight when it is used with a healthy diet and physical activity  · Surgery  can help you lose weight if you are very obese and have other health problems  There are several types of weight-loss surgery  Ask your healthcare provider for more information  Be successful losing weight:   · Set small, realistic goals  An example of a small goal is to walk for 20 minutes 5 days a week  Anther goal is to lose 5% of your body weight  · Tell friends, family members, and coworkers about your goals  and ask for their support  Ask a friend to lose weight with you, or join a weight-loss support group  · Identify foods or triggers that may cause you to overeat , and find ways to avoid them  Remove tempting high-calorie foods from your home and workplace  Place a bowl of fresh fruit on your kitchen counter  If stress causes you to eat, then find other ways to cope with stress  · Keep a diary to track what you eat and drink  Also write down how many minutes of physical activity you do each day  Weigh yourself once a week and record it in your diary  Eating changes: You will need to eat 500 to 1,000 fewer calories each day than you currently eat to lose 1 to 2 pounds a week  The following changes will help you cut calories:  · Eat smaller portions  Use small plates, no larger than 9 inches in diameter  Fill your plate half full of fruits and vegetables  Measure your food using measuring cups until you know what a serving size looks like  · Eat 3 meals and 1 or 2 snacks each day  Plan your meals in advance  Quita Laughter and eat at home most of the time  Eat slowly  · Eat fruits and vegetables at every meal   They are low in calories and high in fiber, which makes you feel full   Do not add butter, margarine, or cream sauce to vegetables  Use herbs to season steamed vegetables  · Eat less fat and fewer fried foods  Eat more baked or grilled chicken and fish  These protein sources are lower in calories and fat than red meat  Limit fast food  Dress your salads with olive oil and vinegar instead of bottled dressing  · Limit the amount of sugar you eat  Do not drink sugary beverages  Limit alcohol  Activity changes:  Physical activity is good for your body in many ways  It helps you burn calories and build strong muscles  It decreases stress and depression, and improves your mood  It can also help you sleep better  Talk to your healthcare provider before you begin an exercise program   · Exercise for at least 30 minutes 5 days a week  Start slowly  Set aside time each day for physical activity that you enjoy and that is convenient for you  It is best to do both weight training and an activity that increases your heart rate, such as walking, bicycling, or swimming  · Find ways to be more active  Do yard work and housecleaning  Walk up the stairs instead of using elevators  Spend your leisure time going to events that require walking, such as outdoor festivals or fairs  This extra physical activity can help you lose weight and keep it off  Follow up with your healthcare provider as directed: You may need to meet with a dietitian  Write down your questions so you remember to ask them during your visits  © 2017 [employer] Information is for End User's use only and may not be sold, redistributed or otherwise used for commercial purposes  All illustrations and images included in CareNotes® are the copyrighted property of [employer]  or [employer]ugio Santos  The above information is an  only  It is not intended as medical advice for individual conditions or treatments   Talk to your doctor, nurse or pharmacist before following any medical regimen to see if it is safe and effective for you

## 2020-07-20 ENCOUNTER — TELEPHONE (OUTPATIENT)
Dept: PAIN MEDICINE | Facility: CLINIC | Age: 40
End: 2020-07-20

## 2020-07-20 NOTE — TELEPHONE ENCOUNTER
1st attempt  Lm to cb with % improvement and pain level         S/p Rt Intra Articular Hip Inj 7/13 No F/u

## 2020-08-27 ENCOUNTER — EVALUATION (OUTPATIENT)
Dept: PHYSICAL THERAPY | Facility: CLINIC | Age: 40
End: 2020-08-27
Payer: COMMERCIAL

## 2020-08-27 DIAGNOSIS — M16.11 PRIMARY OSTEOARTHRITIS OF ONE HIP, RIGHT: Primary | ICD-10-CM

## 2020-08-27 PROCEDURE — 97161 PT EVAL LOW COMPLEX 20 MIN: CPT

## 2020-08-27 PROCEDURE — 97110 THERAPEUTIC EXERCISES: CPT

## 2020-08-27 NOTE — PROGRESS NOTES
PT Evaluation     Today's date: 2020  Patient name: Gloria Mcallister  : 1980  MRN: [id number]  Referring provider: Loyda Iqbal MD  Dx:   Encounter Diagnosis     ICD-10-CM    1  Primary osteoarthritis of one hip, right  M16 11                   Assessment  Assessment details: Pt is a 42 y o  male presenting to PT with chronic hip pain since injury occurring in college with subsequent labral tear  PT findings include: hip mobility deficits, significant hip active stabilizer weakness (hip abd, hip ER, IR, flexion) and positive scour, bren all consistent with diagnosis of OA and labral tear  Pt will benefit from skilled PT to address above impairments to return to PLOF with less restriction and to reach functional goals  Impairments: abnormal muscle tone, abnormal or restricted ROM, impaired physical strength, lacks appropriate home exercise program and pain with function  Functional limitations: difficulty with stairs, walking long distances, lifting, running  Symptom irritability: lowUnderstanding of Dx/Px/POC: good   Prognosis: good    Goals  1  Pt will demonstrate understanding of HEP and POC in order to improve compliance with course of rehab in 2 weeks  2  Pt will demonstrate decrease of pain at worst by 3 points in VAS so that pt can walk community distances with less restriction in 4 weeks  3  Pt will demonstrate 5/5 hip abduction, ER and hip flexion strength so that pt can perform stair climbing with no restriction by d/c    4  Pt will demonstrate normal hip joint mobility so that pt can put on sock and lift with less restriction by d/c       Plan  Patient would benefit from: skilled physical therapy  Planned therapy interventions: manual therapy, joint mobilization, neuromuscular re-education, patient education, strengthening, stretching, therapeutic activities, therapeutic exercise and home exercise program  Frequency: 2x week  Duration in weeks: 8  Treatment plan discussed with: patient        Subjective Evaluation    History of Present Illness  Mechanism of injury: Pt states that he got injured in the R hip wresting in college   He was told he has "microtears" in the R hip  He states that around , he got a hip debridement which helped which his pain with subsequent cortisone injections which helped with pain further  He states that he has been functioning okay since then but not completely pain free and full function  Pt states that he was never able to get "fully active"  On march, he was running out the door and slid down the steps (baseball slide style)  He states that after that, few days after, he started getting some discomfort and stiffness of the R hip  Since then, he started going to the gym with light working out  He recently got a cortisone injection which helped with pain  He got an MRI with arthrogram with findings of slight torn labrum  Quality of life: good    Pain  Current pain ratin  At best pain ratin  At worst pain ratin  Location: R hip  Quality: throbbing, pulling and dull ache  Relieving factors: relaxation, rest and ice  Aggravating factors: standing, walking, stair climbing, running and lifting  Progression: worsening      Diagnostic Tests  MRI studies: abnormal (torn labrum)  Treatments  Previous treatment: injection treatment  Patient Goals  Patient goals for therapy: decreased pain, increased motion, increased strength and independence with ADLs/IADLs          Objective    Flowsheet Rows      Most Recent Value   PT/OT G-Codes   Current Score  35   Projected Score  62      Hip ROM (R/L):  Hip Flex: 75°/100°  Hip Ext: lacking 5°/0°  Hip IR: 35°/35°  Hip ER: 25°/50°    Hip Joint Mobility (R/L):  AP: hypo/normal  PA: hypo/normal  Inferior: TBA    Palpation: + TTP gluteus medius/min on R       Strength (R;L)  Hip Flex: 3+/5; 4+/5  Hip ABD: 4/5;   Hip Ext: 4/5; 3+/5  Hip IR: 3+/5; 3+/5  Hip ER: 4/5; 5/5  Knee flex: 4/5; 4+/5    Special Tests:  Blake Hdez +/+  Marcello Leone: +/-  APPLE: +/-  Leslie: +/-       Precautions: None      Manuals 8/27            Glute PROM/glute stretches             Hip joint mob: AP, PA, lat distraction                                       Neuro Re-Ed                                                                                                        Ther Ex             bridging HEP demo            clamshell HEP (black) demo            Hip abduction HEP demo            Hip extension HEP demo            firehydrant              Hip skater             Lateral band walk             Donkey kick             Leg press (glute bias)             Ther Activity             Bike                          Gait Training                                       Modalities

## 2020-09-01 DIAGNOSIS — J45.40 MODERATE PERSISTENT ASTHMA WITHOUT COMPLICATION: ICD-10-CM

## 2020-09-01 RX ORDER — MONTELUKAST SODIUM 10 MG/1
TABLET ORAL
Qty: 90 TABLET | Refills: 1 | Status: SHIPPED | OUTPATIENT
Start: 2020-09-01

## 2020-09-02 ENCOUNTER — OFFICE VISIT (OUTPATIENT)
Dept: PHYSICAL THERAPY | Facility: CLINIC | Age: 40
End: 2020-09-02
Payer: COMMERCIAL

## 2020-09-02 DIAGNOSIS — M16.11 PRIMARY OSTEOARTHRITIS OF ONE HIP, RIGHT: Primary | ICD-10-CM

## 2020-09-02 PROCEDURE — 97140 MANUAL THERAPY 1/> REGIONS: CPT

## 2020-09-02 PROCEDURE — 97530 THERAPEUTIC ACTIVITIES: CPT

## 2020-09-02 PROCEDURE — 97110 THERAPEUTIC EXERCISES: CPT

## 2020-09-02 NOTE — PROGRESS NOTES
Daily Note     Today's date: 2020  Patient name: Ana Brooks  : 1980  MRN: [id number]  Referring provider: Joycelyn Michael MD  Dx:   Encounter Diagnosis     ICD-10-CM    1  Primary osteoarthritis of one hip, right  M16 11                   Subjective: Pt reports that he did not perform his hip exercises until yesterday  He states that the exercises were challenging in terms of dosage  Objective: See treatment diary below      Assessment: Pt requires cues to perform hip abduction appropriately without compensating with excessive TFL use  Pt with mm  Fatigue post session indicating therapeutic dosage achieved  Pt will benefit form continued skilled PT  Plan: Continue per plan of care        Precautions: None      Manuals            Glute PROM/glute stretches  DL (figure 4 to chest)           Hip joint mob: PA, lat distraction  Gr II-III DL                                      Neuro Re-Ed                                                                                                        Ther Ex             bridging HEP demo 3x10           clamshell HEP (black) demo Blue 3x10           Hip abduction HEP demo 3x10           Hip extension HEP demo 3x10            firehydrant   Grn 2x10           Hip skater             Lateral band walk  Grn @ ankle 3 laps 12'           Donkey kick             Leg press (glute bias)             Ther Activity             Bike  8'                        Gait Training                                       Modalities

## 2020-09-04 ENCOUNTER — OFFICE VISIT (OUTPATIENT)
Dept: PHYSICAL THERAPY | Facility: CLINIC | Age: 40
End: 2020-09-04
Payer: COMMERCIAL

## 2020-09-04 DIAGNOSIS — M16.11 PRIMARY OSTEOARTHRITIS OF ONE HIP, RIGHT: Primary | ICD-10-CM

## 2020-09-04 PROCEDURE — 97112 NEUROMUSCULAR REEDUCATION: CPT

## 2020-09-04 PROCEDURE — 97530 THERAPEUTIC ACTIVITIES: CPT

## 2020-09-04 PROCEDURE — 97110 THERAPEUTIC EXERCISES: CPT

## 2020-09-04 NOTE — PROGRESS NOTES
Daily Note     Today's date: 2020  Patient name: Roland Masterson  : 1980  MRN: [id number]  Referring provider: Yary Rodriguez MD  Dx:   Encounter Diagnosis     ICD-10-CM    1  Primary osteoarthritis of one hip, right  M16 11                   Subjective: Pt reports that he felt mm  Soreness in hips and still has some residual soreness today  Objective: See treatment diary below      Assessment: Pt educated on expected mm  Soreness and educated on importance of consistency performing HEP  As pt continues with mm  Soreness, exercises not progressed  Tigertail STM initiated in order to alleviate mm  Soreness and should be continued per presentation next visit  Pt will benefit from continued skilled PT  Plan: Continue per plan of care        Precautions: None      Manuals           Glute PROM/glute stretches  DL (figure 4 to chest) DL (figure 4 to chest)          Hip joint mob: PA, lat distraction  Gr II-III DL  Gr II-III DL           STM glutes/HS/IT band   Tiger tail DL B LE                       Neuro Re-Ed                                                                                                        Ther Ex             bridging HEP demo 3x10 3x10          clamshell HEP (black) demo Blue 3x10 Blue 3x10          Hip abduction HEP demo 3x10 3x10          Hip extension HEP demo 3x10  3x10          firehydrant   Grn 2x10 Grn 3x10          Hip skater             Donkey kick             Leg press (glute bias)             Ther Activity             Bike  8' 8'          Lateral Band Walk  Grn @ ankle 3 laps 12 Grn @ ankle 3 laps 12'          Gait Training                                       Modalities

## 2020-09-08 ENCOUNTER — OFFICE VISIT (OUTPATIENT)
Dept: PHYSICAL THERAPY | Facility: CLINIC | Age: 40
End: 2020-09-08
Payer: COMMERCIAL

## 2020-09-08 DIAGNOSIS — M16.11 PRIMARY OSTEOARTHRITIS OF ONE HIP, RIGHT: Primary | ICD-10-CM

## 2020-09-08 PROCEDURE — 97110 THERAPEUTIC EXERCISES: CPT

## 2020-09-08 PROCEDURE — 97140 MANUAL THERAPY 1/> REGIONS: CPT

## 2020-09-08 PROCEDURE — 97530 THERAPEUTIC ACTIVITIES: CPT

## 2020-09-08 NOTE — PROGRESS NOTES
Daily Note     Today's date: 2020  Patient name: Stephen Marrero  : 1980  MRN: [id number]  Referring provider: Mitzy Goldberg MD  Dx:   Encounter Diagnosis     ICD-10-CM    1  Primary osteoarthritis of one hip, right  M16 11                   Subjective: Pt reports that he felt mm  Soreness for about 1-2 days after last PT session  He reports that his hip sx are slightly improved  In addition, pt's hip/ITband tightness experienced last visit has improved  Objective: See treatment diary below      Assessment: Pt with continued hip mm  Soreness after last visit, therefore continued with small progressions; SLR added today as well which pt tolerated fair  PT will continue to monitor pt sx and continue progressions in order for pt to reach goals  Plan: Continue per plan of care        Precautions: None      Manuals          Glute PROM/glute stretches  DL (figure 4 to chest) DL (figure 4 to chest) DL (figure 4 to chest)         Hip joint mob: PA, lat distraction  Gr II-III DL  Gr II-III DL  Gr II-III DL          STM glutes/HS/IT band   Tiger tail DL B LE                       Neuro Re-Ed                                                                                                        Ther Ex             bridging HEP demo 3x10 3x10 3x10         clamshell HEP (black) demo Blue 3x10 Blue 3x10 Blue 3x10         Hip abduction HEP demo 3x10 3x10 3x10         Hip extension HEP demo 3x10  3x10 3x10         SLR    2x10         firehydrant   Grn 2x10 Grn 3x10 Grn 3x10         LAQ    NV         Hip skater             Donkey kick             Leg press (glute bias)             Ther Activity             Bike  8' 8' 8'         Lateral Band Walk  Grn @ ankle 3 laps 12 Grn @ ankle 3 laps 12' Blue @ ankle 4 laps 12'         Gait Training                                       Modalities

## 2020-09-10 ENCOUNTER — APPOINTMENT (OUTPATIENT)
Dept: PHYSICAL THERAPY | Facility: CLINIC | Age: 40
End: 2020-09-10
Payer: COMMERCIAL

## 2020-09-11 ENCOUNTER — OFFICE VISIT (OUTPATIENT)
Dept: PHYSICAL THERAPY | Facility: CLINIC | Age: 40
End: 2020-09-11
Payer: COMMERCIAL

## 2020-09-11 DIAGNOSIS — M16.11 PRIMARY OSTEOARTHRITIS OF ONE HIP, RIGHT: Primary | ICD-10-CM

## 2020-09-11 PROCEDURE — 97530 THERAPEUTIC ACTIVITIES: CPT

## 2020-09-11 PROCEDURE — 97140 MANUAL THERAPY 1/> REGIONS: CPT

## 2020-09-11 PROCEDURE — 97110 THERAPEUTIC EXERCISES: CPT

## 2020-09-11 NOTE — PROGRESS NOTES
Daily Note     Today's date: 2020  Patient name: Jelani Peterson  : 1980  MRN: [id number]  Referring provider: Juan Carlos Hannon MD  Dx:   Encounter Diagnosis     ICD-10-CM    1  Primary osteoarthritis of one hip, right  M16 11                   Subjective: Pt reports significant soreness after last PT visit, especially in the groin  Pt believes that SLR was difficult  Objective: See treatment diary below      Assessment: Pt with significant hip soreness, especially with SLR, therefore plan to initiate standing marches NV  Pt with 6/10 mm  soreness, therefore home exercises deferred until next visit as well  S/l hip adduction and supine tball adduction initiated with fatigue noted  Pt will benefit from continued skilled PT to reach goals  Plan: Continue per plan of care  Precautions: None      Manuals         Glute PROM/glute stretches  DL (figure 4 to chest) DL (figure 4 to chest) DL (figure 4 to chest) DL (figure 4 to chest)        Hip joint mob: PA, lat distraction  Gr II-III DL  Gr II-III DL  Gr II-III DL  Gr II-III DL d/c NV        STM glutes/HS/IT band   Tiger tail DL B LE  Tiger tail quad, IT band, hip add   NV                     Neuro Re-Ed                                                                                                        Ther Ex             bridging HEP demo 3x10 3x10 3x10 NV        clamshell HEP (black) demo Blue 3x10 Blue 3x10 Blue 3x10 NV        Hip abduction HEP demo 3x10 3x10 3x10 NV        Hip extension HEP demo 3x10  3x10 3x10 NV        SLR    2x10 Standing march NV        firehydrant   Grn 2x10 Grn 3x10 Grn 3x10         S/l Hip adduction     R only 2x10        Supine hip adduction ball squeeze     tball 5"x15        LAQ    NV R only 40# 2x10        Hip IR/ER     Prone tball ER, RTB IR 10x ea        Hip skater             Donkey kick             Leg press (glute bias)             Ther Activity             Bike  8' 8' 8' 8'        Lateral Band Walk  Grn @ ankle 3 laps 12 Grn @ ankle 3 laps 12' Blue @ ankle 4 laps 12' Blue @ ankle 4 laps 12'        Gait Training                                       Modalities

## 2020-09-15 ENCOUNTER — OFFICE VISIT (OUTPATIENT)
Dept: PHYSICAL THERAPY | Facility: CLINIC | Age: 40
End: 2020-09-15
Payer: COMMERCIAL

## 2020-09-15 DIAGNOSIS — M16.11 PRIMARY OSTEOARTHRITIS OF ONE HIP, RIGHT: Primary | ICD-10-CM

## 2020-09-15 PROCEDURE — 97530 THERAPEUTIC ACTIVITIES: CPT

## 2020-09-15 PROCEDURE — 97110 THERAPEUTIC EXERCISES: CPT

## 2020-09-15 PROCEDURE — 97140 MANUAL THERAPY 1/> REGIONS: CPT

## 2020-09-15 NOTE — PROGRESS NOTES
Daily Note     Today's date: 9/15/2020  Patient name: Bobbi Colin  : 1980  MRN: [id number]  Referring provider: Nellie Bahena MD  Dx:   Encounter Diagnosis     ICD-10-CM    1  Primary osteoarthritis of one hip, right  M16 11                   Subjective: Pt reporting no significant changes in sx since last PT visit  He does note "aching" when getting up in the morning  Objective: See treatment diary below      Assessment: As pt with R hip flexion weakness and significant soreness with SLR last visit, standing marches initiated today with better tolerance but still with deficits secondary to weakness and discomfort  HS curl also initiated today with good tolerance  Pt will benefit from continued skilled PT to further strengthen and reach functional goals  Plan: Continue per plan of care  Precautions: None      Manuals 8/27 9/2 9/4 9/8 9/11 9/15       Glute PROM/glute stretches  DL (figure 4 to chest) DL (figure 4 to chest) DL (figure 4 to chest) DL (figure 4 to chest)        Hip joint mob: PA, lat distraction  Gr II-III DL  Gr II-III DL  Gr II-III DL  Gr II-III DL d/c NV        STM glutes/HS/IT band   Tiger tail DL B LE  Tiger tail quad, IT band, hip add   NV Tiger tail: quad, IT band, HS 8'                    Neuro Re-Ed                                                                                                        Ther Ex             bridging HEP demo 3x10 3x10 3x10 NV 3x10       clamshell HEP (black) demo Blue 3x10 Blue 3x10 Blue 3x10 NV Blue 3x10       Hip abduction HEP demo 3x10 3x10 3x10 NV 3x10       Hip extension HEP demo 3x10  3x10 3x10 NV 3x10       SLR    2x10 Standing march NV 10x       firehydrant   Grn 2x10 Grn 3x10 Grn 3x10         S/l Hip adduction     R only 2x10 R only 2x10       Supine hip adduction ball squeeze     tball 5"x15 tball 5"x15       LAQ    NV R only 40# 2x10 R only 40# 2x10       HS curl      R only 40# 2x10       Hip IR/ER     Prone tball ER, RTB IR 10x ea Prone tball ER, RTB IR 10x ea       Hip skater             Donkey kick             Leg press (glute bias)             Ther Activity             Bike  8' 8' 8' 8' 8'       Lateral Band Walk  Grn @ ankle 3 laps 12 Grn @ ankle 3 laps 12' Blue @ ankle 4 laps 12' Blue @ ankle 4 laps 12' Blue @ ankle 4 laps 12'       Gait Training                                       Modalities

## 2020-09-17 ENCOUNTER — APPOINTMENT (OUTPATIENT)
Dept: PHYSICAL THERAPY | Facility: CLINIC | Age: 40
End: 2020-09-17
Payer: COMMERCIAL

## 2020-09-18 ENCOUNTER — OFFICE VISIT (OUTPATIENT)
Dept: PHYSICAL THERAPY | Facility: CLINIC | Age: 40
End: 2020-09-18
Payer: COMMERCIAL

## 2020-09-18 DIAGNOSIS — M16.11 PRIMARY OSTEOARTHRITIS OF ONE HIP, RIGHT: Primary | ICD-10-CM

## 2020-09-18 PROCEDURE — 97530 THERAPEUTIC ACTIVITIES: CPT

## 2020-09-18 PROCEDURE — 97110 THERAPEUTIC EXERCISES: CPT

## 2020-09-18 NOTE — PROGRESS NOTES
Daily Note     Today's date: 2020  Patient name: Gloria Mcallister  : 1980  MRN: [id number]  Referring provider: Loyda Iqbal MD  Dx:   Encounter Diagnosis     ICD-10-CM    1  Primary osteoarthritis of one hip, right  M16 11                   Subjective: Pt reports after last PT session, he was not as sore  He notes that yesterday he did a lot of standing, therefore feels it more in the R hip today  He states that he feels that he is starting to feel improvements in pain slowly  Objective: See treatment diary below      Assessment: Pt with femoral nn  Tension sx with prone hip extensions, therefore introduced femoral nerve glides today  PT will continue to monitor for femoral nerve sx and continue prn  As pt with minimal soreness after last PT session, exercises progressed with good tolerance and mm  Fatigue noted  Pt will benefit from continued skilled PT  Plan: Continue per plan of care  Precautions: None      Manuals 8/27 9/2 9/4 9/8 9/11 9/15 9/18      Glute PROM/glute stretches  DL (figure 4 to chest) DL (figure 4 to chest) DL (figure 4 to chest) DL (figure 4 to chest)        Hip joint mob: PA, lat distraction  Gr II-III DL  Gr II-III DL  Gr II-III DL  Gr II-III DL d/c NV        STM glutes/HS/IT band   Tiger tail DL B LE  Tiger tail quad, IT band, hip add   NV Tiger tail: quad, IT band, HS 8'                    Neuro Re-Ed                                                                                                        Ther Ex             bridging HEP demo 3x10 3x10 3x10 NV 3x10 3x15      clamshell HEP (black) demo Blue 3x10 Blue 3x10 Blue 3x10 NV Blue 3x10 Black 3x10       Hip abduction HEP demo 3x10 3x10 3x10 NV 3x10 2# 3x10      Hip extension HEP demo 3x10  3x10 3x10 NV 3x10 2# 3x10      SLR    2x10 Standing march NV 10x stand march 2x10 R only      firehydrant   Grn 2x10 Grn 3x10 Grn 3x10         S/l Hip adduction     R only 2x10 R only 2x10 R only 3x10      Supine hip adduction ball squeeze     tball 5"x15 tball 5"x15 tball 5"x15      LAQ    NV R only 40# 2x10 R only 40# 2x10 R only 3x10      HS curl      R only 40# 2x10 R only 50# 3x10      Hip IR/ER     Prone tball ER, RTB IR 10x ea Prone tball ER, RTB IR 10x ea Prone tball ER, RTB IR 15x ea      Hip skater             Donkey kick             Leg press (glute bias)             Ther Activity             Bike  8' 8' 8' 8' 8' 8'      Lateral Band Walk  Grn @ ankle 3 laps 12 Grn @ ankle 3 laps 12' Blue @ ankle 4 laps 12' Blue @ ankle 4 laps 12' Blue @ ankle 4 laps 12'       Gait Training                                       Modalities

## 2020-09-22 ENCOUNTER — APPOINTMENT (OUTPATIENT)
Dept: PHYSICAL THERAPY | Facility: CLINIC | Age: 40
End: 2020-09-22
Payer: COMMERCIAL

## 2020-09-24 ENCOUNTER — APPOINTMENT (OUTPATIENT)
Dept: PHYSICAL THERAPY | Facility: CLINIC | Age: 40
End: 2020-09-24
Payer: COMMERCIAL

## 2020-09-25 ENCOUNTER — OFFICE VISIT (OUTPATIENT)
Dept: PHYSICAL THERAPY | Facility: CLINIC | Age: 40
End: 2020-09-25
Payer: COMMERCIAL

## 2020-09-25 DIAGNOSIS — M16.11 PRIMARY OSTEOARTHRITIS OF ONE HIP, RIGHT: Primary | ICD-10-CM

## 2020-09-25 PROCEDURE — 97530 THERAPEUTIC ACTIVITIES: CPT

## 2020-09-25 PROCEDURE — 97110 THERAPEUTIC EXERCISES: CPT

## 2020-09-25 NOTE — PROGRESS NOTES
Daily Note     Today's date: 2020  Patient name: Felicity Hannon  : 1980  MRN: [id number]  Referring provider: Blanco Masterson MD  Dx:   Encounter Diagnosis     ICD-10-CM    1  Primary osteoarthritis of one hip, right  M16 11                   Subjective: Pt reports that he had a flare up in his pain, therefore had to cancel last appointment  He reports that his hip is overall "okay" however his groin is painful  He notes feeling "something catching" in the groin area with some movements  With isolated movements, denies significant discomfort  Objective: See treatment diary below      Assessment: Psoas and iliacus assessed with no recreation of pain  Pt with findings of femoral head spur on most recent X-ray which may be explaining pt subjective with rotational movements of the hip  PT attempted LAD and gentle AP joint mobs as pt with restriction with R hip flexion; PT to assess response next PT session  PT continued with same plan and continuation of strengthening of hip mm in pain free range  Pt will benefit from continued skilled PT  Plan: Continue per plan of care  Precautions: None      Manuals 8/27 9/2 9/4 9/8 9/11 9/15 9/18 9/25     Glute PROM/glute stretches  DL (figure 4 to chest) DL (figure 4 to chest) DL (figure 4 to chest) DL (figure 4 to chest)        Hip joint mob: PA, lat distraction  Gr II-III DL  Gr II-III DL  Gr II-III DL  Gr II-III DL d/c NV   AP joint mob gentle Gr I-II; LAD gentle Gr I-II     STM glutes/HS/IT band   Tiger tail DL B LE  Tiger tail quad, IT band, hip add   NV Tiger tail: quad, IT band, HS 8'                    Neuro Re-Ed                                                                                                        Ther Ex             bridging HEP demo 3x10 3x10 3x10 NV 3x10 3x15 3x15     clamshell HEP (black) demo Blue 3x10 Blue 3x10 Blue 3x10 NV Blue 3x10 Black 3x10  Black 3x10     Hip abduction HEP demo 3x10 3x10 3x10 NV 3x10 2# 3x10 2# 3x10 Hip extension HEP demo 3x10  3x10 3x10 NV 3x10 2# 3x10 2# 3x10     SLR    2x10 Standing march NV 10x stand march 2x10 R only stand march 10x5"  R only; pain free range     firehydrant   Grn 2x10 Grn 3x10 Grn 3x10         S/l Hip adduction     R only 2x10 R only 2x10 R only 3x10      Supine hip adduction ball squeeze     tball 5"x15 tball 5"x15 tball 5"x15 tball 5"x20     LAQ    NV R only 40# 2x10 R only 40# 2x10 R only 3x10 R only 40# 3x10     HS curl      R only 40# 2x10 R only 50# 3x10 R only 50# 3x10     Hip IR/ER     Prone tball ER, RTB IR 10x ea Prone tball ER, RTB IR 10x ea Prone tball ER, RTB IR 15x ea Prone tball ER, RTB IR 15x ea     Hip skater             Donkey kick             Leg press (glute bias)             Ther Activity             Bike  8' 8' 8' 8' 8' 8' 8'     Lateral Band Walk  Grn @ ankle 3 laps 12 Grn @ ankle 3 laps 12' Blue @ ankle 4 laps 12' Blue @ ankle 4 laps 12' Blue @ ankle 4 laps 12'       Gait Training                                       Modalities

## 2020-09-29 ENCOUNTER — OFFICE VISIT (OUTPATIENT)
Dept: PHYSICAL THERAPY | Facility: CLINIC | Age: 40
End: 2020-09-29
Payer: COMMERCIAL

## 2020-09-29 DIAGNOSIS — M16.11 PRIMARY OSTEOARTHRITIS OF ONE HIP, RIGHT: Primary | ICD-10-CM

## 2020-09-29 PROCEDURE — 97110 THERAPEUTIC EXERCISES: CPT

## 2020-09-29 PROCEDURE — 97530 THERAPEUTIC ACTIVITIES: CPT

## 2020-09-29 NOTE — PROGRESS NOTES
Daily Note     Today's date: 2020  Patient name: Janis Moreland  : 1980  MRN: [id number]  Referring provider: Roxanne Tellez MD  Dx:   Encounter Diagnosis     ICD-10-CM    1  Primary osteoarthritis of one hip, right  M16 11                   Subjective: Pt reports that since last PT session, he feels like he is "a little better"  He reports that he did not have any "flare ups" over the weekend  Objective: See treatment diary below      Assessment: Pt with few progressions today as pt with subjective improved sx last visit  Resistance increased for standing marches with challenge to flex toward end range due to discomfort  Pt with subjective improvement in pain while performing prone hip ER indicating slight improvements, in addition reports not "limping" at end of session with improvement as well post PT  PT will continue to monitor and progress toward goals  Plan: Continue per plan of care  Precautions: None      Manuals 8/27 9/2 9/4 9/8 9/11 9/15 9/18 9/25 9/29    Glute PROM/glute stretches  DL (figure 4 to chest) DL (figure 4 to chest) DL (figure 4 to chest) DL (figure 4 to chest)        Hip joint mob: PA, lat distraction  Gr II-III DL  Gr II-III DL  Gr II-III DL  Gr II-III DL d/c NV   AP joint mob gentle Gr I-II; LAD gentle Gr I-II LAD/inferior glide Gentle Gr I-II     STM glutes/HS/IT band   Tiger tail DL B LE  Tiger tail quad, IT band, hip add   NV Tiger tail: quad, IT band, HS 8'                    Neuro Re-Ed                                                                                                        Ther Ex             bridging HEP demo 3x10 3x10 3x10 NV 3x10 3x15 3x15 3x15    clamshell HEP (black) demo Blue 3x10 Blue 3x10 Blue 3x10 NV Blue 3x10 Black 3x10  Black 3x10 Black 3x15    Hip abduction HEP demo 3x10 3x10 3x10 NV 3x10 2# 3x10 2# 3x10  2# 3x10     Hip extension HEP demo 3x10  3x10 3x10 NV 3x10 2# 3x10 2# 3x10 2# 3x10    SLR    2x10 Standing march NV 10x stand march 2x10 R only stand march 10x5"  R only; pain free range Stand march 10x5" RTB @ foot    firehydrant   Grn 2x10 Grn 3x10 Grn 3x10         S/l Hip adduction     R only 2x10 R only 2x10 R only 3x10  R only 3x10 2#    Supine hip adduction ball squeeze     tball 5"x15 tball 5"x15 tball 5"x15 tball 5"x20 tball 5"x20    LAQ    NV R only 40# 2x10 R only 40# 2x10 R only 3x10 R only 40# 3x10 R only 50# 2x10    HS curl      R only 40# 2x10 R only 50# 3x10 R only 50# 3x10 R only 60# 2x10    Hip IR/ER     Prone tball ER, RTB IR 10x ea Prone tball ER, RTB IR 10x ea Prone tball ER, RTB IR 15x ea Prone tball ER, RTB IR 15x ea Prone tball ER, RTB IR 15x ea    Hip skater             Donkey kick             Leg press (glute bias)         60# 3x10    Ther Activity             Bike  8' 8' 8' 8' 8' 8' 8' 8'    Lateral Band Walk  Grn @ ankle 3 laps 12 Grn @ ankle 3 laps 12' Blue @ ankle 4 laps 12' Blue @ ankle 4 laps 12' Blue @ ankle 4 laps 12'       Gait Training                                       Modalities

## 2020-10-01 ENCOUNTER — APPOINTMENT (OUTPATIENT)
Dept: PHYSICAL THERAPY | Facility: CLINIC | Age: 40
End: 2020-10-01
Payer: COMMERCIAL

## 2020-10-02 ENCOUNTER — OFFICE VISIT (OUTPATIENT)
Dept: PHYSICAL THERAPY | Facility: CLINIC | Age: 40
End: 2020-10-02
Payer: COMMERCIAL

## 2020-10-02 DIAGNOSIS — M16.11 PRIMARY OSTEOARTHRITIS OF ONE HIP, RIGHT: Primary | ICD-10-CM

## 2020-10-02 PROCEDURE — 97530 THERAPEUTIC ACTIVITIES: CPT

## 2020-10-02 PROCEDURE — 97110 THERAPEUTIC EXERCISES: CPT

## 2020-10-14 ENCOUNTER — OFFICE VISIT (OUTPATIENT)
Dept: PHYSICAL THERAPY | Facility: CLINIC | Age: 40
End: 2020-10-14
Payer: COMMERCIAL

## 2020-10-14 DIAGNOSIS — M16.11 PRIMARY OSTEOARTHRITIS OF ONE HIP, RIGHT: Primary | ICD-10-CM

## 2020-10-14 PROCEDURE — 97110 THERAPEUTIC EXERCISES: CPT

## 2020-10-14 PROCEDURE — 97140 MANUAL THERAPY 1/> REGIONS: CPT

## 2020-10-21 ENCOUNTER — EVALUATION (OUTPATIENT)
Dept: PHYSICAL THERAPY | Facility: CLINIC | Age: 40
End: 2020-10-21
Payer: COMMERCIAL

## 2020-10-21 DIAGNOSIS — M16.11 PRIMARY OSTEOARTHRITIS OF ONE HIP, RIGHT: Primary | ICD-10-CM

## 2020-10-21 PROCEDURE — 97164 PT RE-EVAL EST PLAN CARE: CPT

## 2020-10-21 PROCEDURE — 97110 THERAPEUTIC EXERCISES: CPT

## 2020-10-22 ENCOUNTER — OFFICE VISIT (OUTPATIENT)
Dept: OBGYN CLINIC | Facility: CLINIC | Age: 40
End: 2020-10-22
Payer: COMMERCIAL

## 2020-10-22 VITALS
TEMPERATURE: 98.1 F | BODY MASS INDEX: 39.2 KG/M2 | WEIGHT: 280 LBS | HEIGHT: 71 IN | DIASTOLIC BLOOD PRESSURE: 80 MMHG | SYSTOLIC BLOOD PRESSURE: 118 MMHG

## 2020-10-22 DIAGNOSIS — M16.11 PRIMARY OSTEOARTHRITIS OF ONE HIP, RIGHT: Primary | ICD-10-CM

## 2020-10-22 PROCEDURE — 1036F TOBACCO NON-USER: CPT | Performed by: ORTHOPAEDIC SURGERY

## 2020-10-22 PROCEDURE — 99213 OFFICE O/P EST LOW 20 MIN: CPT | Performed by: ORTHOPAEDIC SURGERY

## 2020-10-28 ENCOUNTER — APPOINTMENT (OUTPATIENT)
Dept: PHYSICAL THERAPY | Facility: CLINIC | Age: 40
End: 2020-10-28
Payer: COMMERCIAL

## 2020-11-03 ENCOUNTER — OFFICE VISIT (OUTPATIENT)
Dept: OBGYN CLINIC | Facility: MEDICAL CENTER | Age: 40
End: 2020-11-03
Payer: COMMERCIAL

## 2020-11-03 VITALS
HEIGHT: 71 IN | TEMPERATURE: 98.7 F | WEIGHT: 280 LBS | SYSTOLIC BLOOD PRESSURE: 116 MMHG | BODY MASS INDEX: 39.2 KG/M2 | HEART RATE: 80 BPM | DIASTOLIC BLOOD PRESSURE: 74 MMHG

## 2020-11-03 DIAGNOSIS — M16.11 PRIMARY OSTEOARTHRITIS OF ONE HIP, RIGHT: ICD-10-CM

## 2020-11-03 PROCEDURE — 3008F BODY MASS INDEX DOCD: CPT | Performed by: ORTHOPAEDIC SURGERY

## 2020-11-03 PROCEDURE — 1036F TOBACCO NON-USER: CPT | Performed by: ORTHOPAEDIC SURGERY

## 2020-11-03 PROCEDURE — 99214 OFFICE O/P EST MOD 30 MIN: CPT | Performed by: ORTHOPAEDIC SURGERY

## 2021-01-08 DIAGNOSIS — J45.40 MODERATE PERSISTENT ASTHMA WITHOUT COMPLICATION: ICD-10-CM

## 2021-01-08 RX ORDER — ALBUTEROL SULFATE 90 UG/1
2 AEROSOL, METERED RESPIRATORY (INHALATION) EVERY 4 HOURS PRN
Qty: 8.5 INHALER | Refills: 1 | Status: SHIPPED | OUTPATIENT
Start: 2021-01-08 | End: 2021-11-01 | Stop reason: SDUPTHER

## 2021-04-06 DIAGNOSIS — Z23 ENCOUNTER FOR IMMUNIZATION: ICD-10-CM

## 2021-04-23 ENCOUNTER — IMMUNIZATIONS (OUTPATIENT)
Dept: FAMILY MEDICINE CLINIC | Facility: HOSPITAL | Age: 41
End: 2021-04-23

## 2021-04-23 DIAGNOSIS — Z23 ENCOUNTER FOR IMMUNIZATION: Primary | ICD-10-CM

## 2021-04-23 PROCEDURE — 91301 SARS-COV-2 / COVID-19 MRNA VACCINE (MODERNA) 100 MCG: CPT

## 2021-04-23 PROCEDURE — 0011A SARS-COV-2 / COVID-19 MRNA VACCINE (MODERNA) 100 MCG: CPT

## 2021-05-21 ENCOUNTER — IMMUNIZATIONS (OUTPATIENT)
Dept: FAMILY MEDICINE CLINIC | Facility: HOSPITAL | Age: 41
End: 2021-05-21

## 2021-05-21 DIAGNOSIS — Z23 ENCOUNTER FOR IMMUNIZATION: Primary | ICD-10-CM

## 2021-05-21 PROCEDURE — 91301 SARS-COV-2 / COVID-19 MRNA VACCINE (MODERNA) 100 MCG: CPT

## 2021-05-21 PROCEDURE — 0012A SARS-COV-2 / COVID-19 MRNA VACCINE (MODERNA) 100 MCG: CPT

## 2021-07-07 ENCOUNTER — TELEPHONE (OUTPATIENT)
Dept: OBGYN CLINIC | Facility: CLINIC | Age: 41
End: 2021-07-07

## 2021-07-07 NOTE — TELEPHONE ENCOUNTER
Elena @ [facility] called to request we fax the Office Notes again from 10- & 11- as the first couple pages were cut off      Fax # [phone]  Bobby CORBETT/gurjit # [phone] [facility] [phone]

## 2021-07-07 NOTE — TELEPHONE ENCOUNTER
[facility] requested recent office note, med list and radiology report on PT for upcoming 7/8/21 appt  Documents faxed

## 2021-11-01 DIAGNOSIS — J45.40 MODERATE PERSISTENT ASTHMA WITHOUT COMPLICATION: ICD-10-CM

## 2021-11-01 RX ORDER — ALBUTEROL SULFATE 90 UG/1
2 AEROSOL, METERED RESPIRATORY (INHALATION) EVERY 4 HOURS PRN
Qty: 9 G | Refills: 0 | Status: SHIPPED | OUTPATIENT
Start: 2021-11-01 | End: 2022-04-12 | Stop reason: SDUPTHER

## 2022-04-07 ENCOUNTER — OFFICE VISIT (OUTPATIENT)
Dept: FAMILY MEDICINE CLINIC | Facility: HOSPITAL | Age: 42
End: 2022-04-07
Payer: COMMERCIAL

## 2022-04-07 VITALS
WEIGHT: 268 LBS | BODY MASS INDEX: 37.52 KG/M2 | SYSTOLIC BLOOD PRESSURE: 128 MMHG | OXYGEN SATURATION: 97 % | DIASTOLIC BLOOD PRESSURE: 72 MMHG | TEMPERATURE: 97 F | HEIGHT: 71 IN | HEART RATE: 86 BPM

## 2022-04-07 DIAGNOSIS — Z13.29 SCREENING FOR ENDOCRINE, METABOLIC AND IMMUNITY DISORDER: ICD-10-CM

## 2022-04-07 DIAGNOSIS — F98.8 ATTENTION DEFICIT DISORDER (ADD) WITHOUT HYPERACTIVITY: ICD-10-CM

## 2022-04-07 DIAGNOSIS — Z13.228 SCREENING FOR ENDOCRINE, METABOLIC AND IMMUNITY DISORDER: ICD-10-CM

## 2022-04-07 DIAGNOSIS — Z11.59 ENCOUNTER FOR HEPATITIS C SCREENING TEST FOR LOW RISK PATIENT: ICD-10-CM

## 2022-04-07 DIAGNOSIS — J45.40 MODERATE PERSISTENT ASTHMA WITHOUT COMPLICATION: ICD-10-CM

## 2022-04-07 DIAGNOSIS — Z13.220 SCREENING CHOLESTEROL LEVEL: ICD-10-CM

## 2022-04-07 DIAGNOSIS — Z00.00 ANNUAL PHYSICAL EXAM: Primary | ICD-10-CM

## 2022-04-07 DIAGNOSIS — Z11.4 SCREENING FOR HIV (HUMAN IMMUNODEFICIENCY VIRUS): ICD-10-CM

## 2022-04-07 DIAGNOSIS — J30.1 NON-SEASONAL ALLERGIC RHINITIS DUE TO POLLEN: ICD-10-CM

## 2022-04-07 DIAGNOSIS — Z13.0 SCREENING FOR ENDOCRINE, METABOLIC AND IMMUNITY DISORDER: ICD-10-CM

## 2022-04-07 PROCEDURE — 1036F TOBACCO NON-USER: CPT | Performed by: NURSE PRACTITIONER

## 2022-04-07 PROCEDURE — 3008F BODY MASS INDEX DOCD: CPT | Performed by: NURSE PRACTITIONER

## 2022-04-07 PROCEDURE — 99214 OFFICE O/P EST MOD 30 MIN: CPT | Performed by: NURSE PRACTITIONER

## 2022-04-07 PROCEDURE — 3725F SCREEN DEPRESSION PERFORMED: CPT | Performed by: NURSE PRACTITIONER

## 2022-04-07 PROCEDURE — 99395 PREV VISIT EST AGE 18-39: CPT | Performed by: NURSE PRACTITIONER

## 2022-04-07 NOTE — PROGRESS NOTES
Assessment/Plan: Moderate persistent asthma without complication  Well controlled with prn albuterol and daily Singulair  He will call with increased albuterol use and will refill his Symbicort  F/U in 6 months  Non-seasonal allergic rhinitis due to pollen  Finds Singulair very helpful  Attention deficit disorder (ADD) without hyperactivity  Stable on Vyvanse for years  Ok to be seen here for ADHD and receive Vyvanse refills through this office  He will get next script from psych then from there we will prescribe  Diagnoses and all orders for this visit:    Annual physical exam    Moderate persistent asthma without complication    Attention deficit disorder (ADD) without hyperactivity    Screening for endocrine, metabolic and immunity disorder  -     CBC and differential; Future  -     Comprehensive metabolic panel; Future    Screening cholesterol level  -     Lipid panel; Future    Screening for HIV (human immunodeficiency virus)  -     HIV 1/2 Antigen/Antibody (4th Generation) w Reflex [facility]; Future    Encounter for hepatitis C screening test for low risk patient  -     Hepatitis C antibody; Future    Non-seasonal allergic rhinitis due to pollen          Subjective:      Patient ID: Zain Haney is a 46 y o  male  Not taking Symbicort  Taking Singulair daily  No recent albuterol use  Needs it more with allergy season and exercise  Currently being treated for ADHD through psychiatry  Recently policy changed and insisting he see therapist  Pt finds it unnecessary and expensive  Has been on Vyvanse 70 mg for the last 5-6 years  Tried non-stimulant meds at first which were ineffective  Tried Adderall which works but did not last as long and felt hyped up on it  Denies any underlying depression or anxiety  Chronic right hip pain  Has seen ortho and pain management  Unsure what his next step will be   Currently just doing stretches but pain is affecting his ability to exercise and sleep        The following portions of the patient's history were reviewed and updated as appropriate: allergies, current medications, past family history, past medical history, past social history, past surgical history and problem list     Review of Systems   Constitutional: Negative  Negative for activity change, appetite change, fatigue, fever and unexpected weight change  HENT: Negative  Negative for congestion, ear pain, sinus pressure, sinus pain, sore throat, trouble swallowing and voice change  Eyes: Negative  Negative for visual disturbance  Respiratory: Negative  Negative for cough, chest tightness, shortness of breath and wheezing  Cardiovascular: Negative  Negative for chest pain, palpitations and leg swelling  Gastrointestinal: Negative  Negative for abdominal pain, blood in stool, constipation, diarrhea, nausea, rectal pain and vomiting  Endocrine: Negative  Genitourinary: Negative for decreased urine volume, difficulty urinating, dysuria, frequency and urgency  Musculoskeletal: Positive for arthralgias (chronic right hip)  Negative for back pain, gait problem, joint swelling, myalgias, neck pain and neck stiffness  Skin: Negative  Negative for rash and wound  Allergic/Immunologic: Positive for environmental allergies  Neurological: Negative  Negative for dizziness, weakness, light-headedness, numbness and headaches  Hematological: Negative  Psychiatric/Behavioral: Negative  Negative for sleep disturbance  The patient is not nervous/anxious  Objective:  Vitals:    04/07/22 0742   BP: 128/72   Pulse: 86   Temp: (!) 97 °F (36 1 °C)   SpO2: 97%      Physical Exam  Vitals reviewed  Constitutional:       Appearance: Normal appearance  He is well-developed  He is obese  HENT:      Head: Normocephalic and atraumatic        Right Ear: Tympanic membrane, ear canal and external ear normal       Left Ear: Tympanic membrane and external ear normal       Nose: Nose normal       Mouth/Throat:      Mouth: Mucous membranes are moist       Pharynx: Oropharynx is clear  No oropharyngeal exudate  Eyes:      Conjunctiva/sclera: Conjunctivae normal       Pupils: Pupils are equal, round, and reactive to light  Neck:      Thyroid: No thyromegaly  Cardiovascular:      Rate and Rhythm: Normal rate and regular rhythm  Heart sounds: Normal heart sounds  No murmur heard  Pulmonary:      Effort: Pulmonary effort is normal       Breath sounds: Normal breath sounds  Abdominal:      General: Abdomen is protuberant  Bowel sounds are normal       Palpations: Abdomen is soft  There is no hepatomegaly or splenomegaly  Tenderness: There is no abdominal tenderness  Musculoskeletal:         General: Normal range of motion  Cervical back: Normal range of motion and neck supple  Lymphadenopathy:      Cervical: No cervical adenopathy  Skin:     General: Skin is warm and dry  Capillary Refill: Capillary refill takes less than 2 seconds  Neurological:      Mental Status: He is alert and oriented to person, place, and time  Deep Tendon Reflexes: Reflexes normal    Psychiatric:         Mood and Affect: Mood normal          Behavior: Behavior normal          Thought Content:  Thought content normal          Judgment: Judgment normal

## 2022-04-07 NOTE — ASSESSMENT & PLAN NOTE
Well controlled with prn albuterol and daily Singulair  He will call with increased albuterol use and will refill his Symbicort  F/U in 6 months

## 2022-04-07 NOTE — PATIENT INSTRUCTIONS
Wellness Visit for Adults   AMBULATORY CARE:   A wellness visit  is when you see your healthcare provider to get screened for health problems  Your healthcare provider will also give you advice on how to stay healthy  Write down your questions so you remember to ask them  Ask your healthcare provider how often you should have a wellness visit  What happens at a wellness visit:  Your healthcare provider will ask about your health, and your family history of health problems  This includes high blood pressure, heart disease, and cancer  He or she will ask if you have symptoms that concern you, if you smoke, and about your mood  You may also be asked about your intake of medicines, supplements, food, and alcohol  Any of the following may be done:  · Your weight  will be checked  Your height may also be checked so your body mass index (BMI) can be calculated  Your BMI shows if you are at a healthy weight  · Your blood pressure  and heart rate will be checked  Your temperature may also be checked  · Blood and urine tests  may be done  Blood tests may be done to check your cholesterol levels  Abnormal cholesterol levels increase your risk for heart disease and stroke  You may also need a blood or urine test to check for diabetes if you are at increased risk  Urine tests may be done to look for signs of an infection or kidney disease  · A physical exam  includes checking your heartbeat and lungs with a stethoscope  Your healthcare provider may also check your skin to look for sun damage  · Screening tests  may be recommended  A screening test is done to check for diseases that may not cause symptoms  The screening tests you may need depend on your age, gender, family history, and lifestyle habits  For example, colorectal screening may be recommended if you are 48years old or older  Screening tests you need if you are a woman:   · A Pap smear  is used to screen for cervical cancer   Pap smears are usually done every 3 to 5 years depending on your age  You may need them more often if you have had abnormal Pap smear test results in the past  Ask your healthcare provider how often you should have a Pap smear  · A mammogram  is an x-ray of your breasts to screen for breast cancer  Experts recommend mammograms every 2 years starting at age 48 years  You may need a mammogram at age 52 years or younger if you have an increased risk for breast cancer  Talk to your healthcare provider about when you should start having mammograms and how often you need them  Vaccines you may need:   · Get an influenza vaccine  every year  The influenza vaccine protects you from the flu  Several types of viruses cause the flu  The viruses change over time, so new vaccines are made each year  · Get a tetanus-diphtheria (Td) booster vaccine  every 10 years  This vaccine protects you against tetanus and diphtheria  Tetanus is a severe infection that may cause painful muscle spasms and lockjaw  Diphtheria is a severe bacterial infection that causes a thick covering in the back of your mouth and throat  · Get a human papillomavirus (HPV) vaccine  if you are female and aged 23 to 32 or male 23 to 24 and never received it  This vaccine protects you from HPV infection  HPV is the most common infection spread by sexual contact  HPV may also cause vaginal, penile, and anal cancers  · Get a pneumococcal vaccine  if you are aged 72 years or older  The pneumococcal vaccine is an injection given to protect you from pneumococcal disease  Pneumococcal disease is an infection caused by pneumococcal bacteria  The infection may cause pneumonia, meningitis, or an ear infection  · Get a shingles vaccine  if you are 60 or older, even if you have had shingles before  The shingles vaccine is an injection to protect you from the varicella-zoster virus  This is the same virus that causes chickenpox   Shingles is a painful rash that develops in people who had chickenpox or have been exposed to the virus  How to eat healthy:  My Plate is a model for planning healthy meals  It shows the types and amounts of foods that should go on your plate  Fruits and vegetables make up about half of your plate, and grains and protein make up the other half  A serving of dairy is included on the side of your plate  The amount of calories and serving sizes you need depends on your age, gender, weight, and height  Examples of healthy foods are listed below:  · Eat a variety of vegetables  such as dark green, red, and orange vegetables  You can also include canned vegetables low in sodium (salt) and frozen vegetables without added butter or sauces  · Eat a variety of fresh fruits , canned fruit in 100% juice, frozen fruit, and dried fruit  · Include whole grains  At least half of the grains you eat should be whole grains  Examples include whole-wheat bread, wheat pasta, brown rice, and whole-grain cereals such as oatmeal     · Eat a variety of protein foods such as seafood (fish and shellfish), lean meat, and poultry without skin (turkey and chicken)  Examples of lean meats include pork leg, shoulder, or tenderloin, and beef round, sirloin, tenderloin, and extra lean ground beef  Other protein foods include eggs and egg substitutes, beans, peas, soy products, nuts, and seeds  · Choose low-fat dairy products such as skim or 1% milk or low-fat yogurt, cheese, and cottage cheese  · Limit unhealthy fats  such as butter, hard margarine, and shortening  Exercise:  Exercise at least 30 minutes per day on most days of the week  Some examples of exercise include walking, biking, dancing, and swimming  You can also fit in more physical activity by taking the stairs instead of the elevator or parking farther away from stores  Include muscle strengthening activities 2 days each week  Regular exercise provides many health benefits   It helps you manage your weight, and decreases your risk for type 2 diabetes, heart disease, stroke, and high blood pressure  Exercise can also help improve your mood  Ask your healthcare provider about the best exercise plan for you  General health and safety guidelines:   · Do not smoke  Nicotine and other chemicals in cigarettes and cigars can cause lung damage  Ask your healthcare provider for information if you currently smoke and need help to quit  E-cigarettes or smokeless tobacco still contain nicotine  Talk to your healthcare provider before you use these products  · Limit alcohol  A drink of alcohol is 12 ounces of beer, 5 ounces of wine, or 1½ ounces of liquor  · Lose weight, if needed  Being overweight increases your risk of certain health conditions  These include heart disease, high blood pressure, type 2 diabetes, and certain types of cancer  · Protect your skin  Do not sunbathe or use tanning beds  Use sunscreen with a SPF 15 or higher  Apply sunscreen at least 15 minutes before you go outside  Reapply sunscreen every 2 hours  Wear protective clothing, hats, and sunglasses when you are outside  · Drive safely  Always wear your seatbelt  Make sure everyone in your car wears a seatbelt  A seatbelt can save your life if you are in an accident  Do not use your cell phone when you are driving  This could distract you and cause an accident  Pull over if you need to make a call or send a text message  · Practice safe sex  Use latex condoms if are sexually active and have more than one partner  Your healthcare provider may recommend screening tests for sexually transmitted infections (STIs)  · Wear helmets, lifejackets, and protective gear  Always wear a helmet when you ride a bike or motorcycle, go skiing, or play sports that could cause a head injury  Wear protective equipment when you play sports  Wear a lifejacket when you are on a boat or doing water sports      © Copyright [employer] 2022 Information is for End User's use only and may not be sold, redistributed or otherwise used for commercial purposes  All illustrations and images included in CareNotes® are the copyrighted property of [employer] M [employer]  or Evita Dennis  The above information is an  only  It is not intended as medical advice for individual conditions or treatments  Talk to your doctor, nurse or pharmacist before following any medical regimen to see if it is safe and effective for you  Obesity   AMBULATORY CARE:   Obesity  means your body mass index (BMI) is greater than 30  Your healthcare provider will use your height and weight to measure your BMI  The risks of obesity include  many health problems, including injuries or physical disability  · Diabetes (high blood sugar level)    · High blood pressure or high cholesterol    · Heart disease    · Stroke    · Gallbladder or liver disease    · Cancer of the colon, breast, prostate, liver, or kidney    · Sleep apnea    · Arthritis or gout    Screening  is done to check for health conditions before you have signs or symptoms  If you are 28to 79years old, your blood sugar level may be checked every 3 years for signs of prediabetes or diabetes  Your healthcare provider will check your blood pressure at each visit  High blood pressure can lead to a stroke or other problems  Your provider may check for signs of heart disease, cancer, or other health problems  Seek care immediately if:   · You have a severe headache, confusion, or difficulty speaking  · You have weakness on one side of your body  · You have chest pain, sweating, or shortness of breath  Call your doctor if:   · You have symptoms of gallbladder or liver disease, such as pain in your upper abdomen  · You have knee or hip pain and discomfort while walking  · You have symptoms of diabetes, such as intense hunger and thirst, and frequent urination      · You have symptoms of sleep apnea, such as snoring or daytime sleepiness  · You have questions or concerns about your condition or care  Treatment for obesity  focuses on helping you lose weight to improve your health  Even a small decrease in BMI can reduce the risk for many health problems  Your healthcare provider will help you set a weight-loss goal   · Lifestyle changes  are the first step in treating obesity  These include making healthy food choices and getting regular physical activity  Your healthcare provider may suggest a weight-loss program that involves coaching, education, and therapy  · Medicine  may help you lose weight when it is used with a healthy foods and physical activity  · Surgery  can help you lose weight if you are very obese and have other health problems  There are several types of weight-loss surgery  Ask your healthcare provider for more information  Tips for safe weight loss:   · Set small, realistic goals  An example of a small goal is to walk for 20 minutes 5 days a week  Anther goal is to lose 5% of your body weight  · Tell friends, family members, and coworkers about your goals  and ask for their support  Ask a friend to lose weight with you, or join a weight-loss support group  · Identify foods or triggers that may cause you to overeat , and find ways to avoid them  Remove tempting high-calorie foods from your home and workplace  Place a bowl of fresh fruit on your kitchen counter  If stress causes you to eat, then find other ways to cope with stress  A counselor or therapist may be able to help you  · Keep a diary to track what you eat and drink  Also write down how many minutes of physical activity you do each day  Weigh yourself once a week and record it in your diary  Eating changes: You will need to eat 500 to 1,000 fewer calories each day than you currently eat to lose 1 to 2 pounds a week  The following changes will help you cut calories:  · Eat smaller portions    Use small plates, no larger than 9 inches in diameter  Fill your plate half full of fruits and vegetables  Measure your food using measuring cups until you know what a serving size looks like  · Eat 3 meals and 1 or 2 snacks each day  Plan your meals in advance  [employer] and eat at home most of the time  Eat slowly  Do not skip meals  Skipping meals can lead to overeating later in the day  This can make it harder for you to lose weight  Talk with a dietitian to help you make a meal plan and schedule that is right for you  · Eat fruits and vegetables at every meal   They are low in calories and high in fiber, which makes you feel full  Do not add butter, margarine, or cream sauce to vegetables  Use herbs to season steamed vegetables  · Eat less fat and fewer fried foods  Eat more baked or grilled chicken and fish  These protein sources are lower in calories and fat than red meat  Limit fast food  Dress your salads with olive oil and vinegar instead of bottled dressing  · Limit the amount of sugar you eat  Do not drink sugary beverages  Limit alcohol  Activity changes:  Physical activity is good for your body in many ways  It helps you burn calories and build strong muscles  It decreases stress and depression, and improves your mood  It can also help you sleep better  Talk to your healthcare provider before you begin an exercise program   · Exercise for at least 30 minutes 5 days a week  Start slowly  Set aside time each day for physical activity that you enjoy and that is convenient for you  It is best to do both weight training and an activity that increases your heart rate, such as walking, bicycling, or swimming  · Find ways to be more active  Do yard work and housecleaning  Walk up the stairs instead of using elevators  Spend your leisure time going to events that require walking, such as outdoor festivals or fairs  This extra physical activity can help you lose weight and keep it off         Follow up with your doctor as directed: You may need to meet with a dietitian  Write down your questions so you remember to ask them during your visits  © Copyright [employer] 2022 Information is for End User's use only and may not be sold, redistributed or otherwise used for commercial purposes  All illustrations and images included in CareNotes® are the copyrighted property of [employer] M [employer] Inc  or [facility] Johana Genao   The above information is an  only  It is not intended as medical advice for individual conditions or treatments  Talk to your doctor, nurse or pharmacist before following any medical regimen to see if it is safe and effective for you

## 2022-04-07 NOTE — ASSESSMENT & PLAN NOTE
Stable on Vyvanse for years  Ok to be seen here for ADHD and receive Vyvanse refills through this office  He will get next script from psych then from there we will prescribe

## 2022-04-07 NOTE — PROGRESS NOTES
[facility] [address]     NAME: Estrella Fernández  AGE: 46 y o  SEX: male  : 1980     DATE: 2022     Assessment and Plan:     Problem List Items Addressed This Visit        Respiratory    Moderate persistent asthma without complication     Well controlled with prn albuterol and daily Singulair  He will call with increased albuterol use and will refill his Symbicort  F/U in 6 months  Non-seasonal allergic rhinitis due to pollen     Finds Singulair very helpful  Other    Attention deficit disorder (ADD) without hyperactivity     Stable on Vyvanse for years  Ok to be seen here for ADHD and receive Vyvanse refills through this office  He will get next script from psych then from there we will prescribe  Other Visit Diagnoses     Annual physical exam    -  Primary    Screening for endocrine, metabolic and immunity disorder        Relevant Orders    CBC and differential    Comprehensive metabolic panel    Screening cholesterol level        Relevant Orders    Lipid panel    Screening for HIV (human immunodeficiency virus)        Relevant Orders    HIV 1/2 Antigen/Antibody (4th Generation) w Reflex [facility]    Encounter for hepatitis C screening test for low risk patient        Relevant Orders    Hepatitis C antibody          Immunizations and preventive care screenings were discussed with patient today  Appropriate education was printed on patient's after visit summary  Counseling:  Alcohol/drug use: discussed moderation in alcohol intake, the recommendations for healthy alcohol use, and avoidance of illicit drug use  Dental Health: discussed importance of regular tooth brushing, flossing, and dental visits  Injury prevention: discussed safety/seat belts, safety helmets, smoke detectors, carbon dioxide detectors, and smoking near bedding or upholstery    Sexual health: discussed sexually transmitted diseases, partner selection, use of condoms, avoidance of unintended pregnancy, and contraceptive alternatives  · Exercise: the importance of regular exercise/physical activity was discussed  Recommend exercise 3-5 times per week for at least 30 minutes  BMI Counseling: Body mass index is 37 38 kg/m²  The BMI is above normal  Nutrition recommendations include decreasing portion sizes, encouraging healthy choices of fruits and vegetables, decreasing fast food intake, consuming healthier snacks, limiting drinks that contain sugar, moderation in carbohydrate intake and increasing intake of lean protein  Exercise recommendations include moderate physical activity 150 minutes/week  No pharmacotherapy was ordered  Rationale for BMI follow-up plan is due to patient being overweight or obese  Depression Screening and Follow-up Plan: Patient was screened for depression during today's encounter  They screened negative with a PHQ-2 score of 0  No follow-ups on file  Chief Complaint:     Chief Complaint   Patient presents with    Annual Exam      History of Present Illness:     Adult Annual Physical   Patient here for a comprehensive physical exam  The patient reports no problems  Diet and Physical Activity  · Diet/Nutrition: well balanced diet  · Exercise: stretching  Depression Screening  PHQ-2/9 Depression Screening    Little interest or pleasure in doing things: 0 - not at all  Feeling down, depressed, or hopeless: 0 - not at all  PHQ-2 Score: 0  PHQ-2 Interpretation: Negative depression screen       General Health  · Sleep: gets 4-6 hours of sleep on average  · Hearing: normal - bilateral   · Vision: goes for regular eye exams and wears glasses  · Dental: no dental visits for >1 year   Health  · History of STDs?: no      Review of Systems:     Review of Systems   Constitutional: Negative    Negative for activity change, appetite change, fatigue, fever and unexpected weight change  HENT: Negative  Negative for congestion, ear pain, sinus pressure, sinus pain, sore throat, trouble swallowing and voice change  Eyes: Negative  Negative for visual disturbance  Respiratory: Negative  Negative for cough, chest tightness, shortness of breath and wheezing  Cardiovascular: Negative  Negative for chest pain, palpitations and leg swelling  Gastrointestinal: Negative  Negative for abdominal pain, blood in stool, constipation, diarrhea, nausea, rectal pain and vomiting  Endocrine: Negative  Genitourinary: Negative  Negative for decreased urine volume, difficulty urinating, dysuria, frequency and urgency  Musculoskeletal: Positive for arthralgias (chronic right hip)  Negative for back pain, gait problem, joint swelling, myalgias, neck pain and neck stiffness  Skin: Negative  Negative for rash and wound  Neurological: Negative  Negative for dizziness, weakness, light-headedness, numbness and headaches  Hematological: Negative  Psychiatric/Behavioral: Negative  Negative for sleep disturbance  The patient is not nervous/anxious         Past Medical History:     Past Medical History:   Diagnosis Date    Allergic       Past Surgical History:     Past Surgical History:   Procedure Laterality Date    HIP SURGERY      KNEE SURGERY        Social History:     Social History     Socioeconomic History    Marital status: Single     Spouse name: None    Number of children: None    Years of education: None    Highest education level: None   Occupational History    None   Tobacco Use    Smoking status: Never Smoker    Smokeless tobacco: Never Used   Vaping Use    Vaping Use: Never used   Substance and Sexual Activity    Alcohol use: Yes     Comment: rare    Drug use: No    Sexual activity: None   Other Topics Concern    None   Social History Narrative    None     Social Determinants of Health     Financial Resource Strain: Not on file   Food Insecurity: Not on file   Transportation Needs: Not on file   Physical Activity: Not on file   Stress: Not on file   Social Connections: Not on file   Intimate Partner Violence: Not on file   Housing Stability: Not on file      Family History:     Family History   Problem Relation Age of Onset    Cancer Family     Arthritis Family       Current Medications:     Current Outpatient Medications   Medication Sig Dispense Refill    albuterol (PROVENTIL HFA,VENTOLIN HFA) 90 mcg/act inhaler Inhale 2 puffs every 4 (four) hours as needed for wheezing 9 g 0    montelukast (SINGULAIR) 10 mg tablet TAKE 1 TABLET BY MOUTH EVERYDAY AT BEDTIME 90 tablet 1    VYVANSE 70 MG capsule Take 70 mg by mouth daily  0    SYMBICORT 80-4 5 MCG/ACT inhaler INHALE 2 PUFFS 2 TIMES A DAY RINSE MOUTH AFTER USE  (Patient not taking: Reported on 4/7/2022) 10 2 Inhaler 0     No current facility-administered medications for this visit  Allergies:     No Known Allergies   Physical Exam:     /72 (BP Location: Left arm, Patient Position: Sitting, Cuff Size: Standard)   Pulse 86   Temp (!) 97 °F (36 1 °C) (Tympanic)   Ht 5' 11" (1 803 m)   Wt 122 kg (268 lb)   SpO2 97%   BMI 37 38 kg/m²     Physical Exam  Vitals reviewed  Constitutional:       Appearance: Normal appearance  He is obese  HENT:      Head: Normocephalic and atraumatic  Right Ear: Tympanic membrane, ear canal and external ear normal       Left Ear: Tympanic membrane, ear canal and external ear normal       Nose: Nose normal       Mouth/Throat:      Mouth: Mucous membranes are moist       Pharynx: Oropharynx is clear  Eyes:      Conjunctiva/sclera: Conjunctivae normal       Pupils: Pupils are equal, round, and reactive to light  Cardiovascular:      Rate and Rhythm: Normal rate and regular rhythm  Heart sounds: Normal heart sounds  No murmur heard  Pulmonary:      Effort: Pulmonary effort is normal       Breath sounds: Normal breath sounds     Abdominal: General: Abdomen is flat  Bowel sounds are normal       Palpations: Abdomen is soft  There is no hepatomegaly or splenomegaly  Tenderness: There is no abdominal tenderness  Musculoskeletal:         General: Normal range of motion  Cervical back: Normal range of motion and neck supple  Skin:     General: Skin is warm and dry  Capillary Refill: Capillary refill takes less than 2 seconds  Neurological:      General: No focal deficit present  Mental Status: He is alert and oriented to person, place, and time  Psychiatric:         Mood and Affect: Mood normal          Behavior: Behavior normal          Thought Content:  Thought content normal          Judgment: Judgment normal           Kevin Horan, [facility] [place] [id number] [phone]

## 2022-04-12 DIAGNOSIS — J45.40 MODERATE PERSISTENT ASTHMA WITHOUT COMPLICATION: ICD-10-CM

## 2022-04-13 RX ORDER — ALBUTEROL SULFATE 90 UG/1
2 AEROSOL, METERED RESPIRATORY (INHALATION) EVERY 4 HOURS PRN
Qty: 9 G | Refills: 1 | Status: SHIPPED | OUTPATIENT
Start: 2022-04-13

## 2022-04-13 RX ORDER — BUDESONIDE AND FORMOTEROL FUMARATE DIHYDRATE 80; 4.5 UG/1; UG/1
2 AEROSOL RESPIRATORY (INHALATION) 2 TIMES DAILY
Qty: 10.2 G | Refills: 5 | Status: SHIPPED | OUTPATIENT
Start: 2022-04-13

## 2022-04-19 DIAGNOSIS — F98.8 ATTENTION DEFICIT DISORDER (ADD) WITHOUT HYPERACTIVITY: Primary | ICD-10-CM

## 2022-04-19 RX ORDER — LISDEXAMFETAMINE DIMESYLATE 70 MG/1
70 CAPSULE ORAL DAILY
Qty: 30 CAPSULE | Refills: 0 | Status: SHIPPED | OUTPATIENT
Start: 2022-04-19 | End: 2022-05-24 | Stop reason: SDUPTHER

## 2022-05-24 DIAGNOSIS — F98.8 ATTENTION DEFICIT DISORDER (ADD) WITHOUT HYPERACTIVITY: ICD-10-CM

## 2022-05-24 RX ORDER — LISDEXAMFETAMINE DIMESYLATE 70 MG/1
70 CAPSULE ORAL DAILY
Qty: 30 CAPSULE | Refills: 0 | Status: SHIPPED | OUTPATIENT
Start: 2022-05-24 | End: 2022-06-23 | Stop reason: SDUPTHER

## 2022-06-23 DIAGNOSIS — F98.8 ATTENTION DEFICIT DISORDER (ADD) WITHOUT HYPERACTIVITY: ICD-10-CM

## 2022-06-23 RX ORDER — LISDEXAMFETAMINE DIMESYLATE 70 MG/1
70 CAPSULE ORAL DAILY
Qty: 30 CAPSULE | Refills: 0 | Status: SHIPPED | OUTPATIENT
Start: 2022-06-23 | End: 2022-07-28 | Stop reason: SDUPTHER

## 2022-07-28 DIAGNOSIS — F98.8 ATTENTION DEFICIT DISORDER (ADD) WITHOUT HYPERACTIVITY: ICD-10-CM

## 2022-07-28 RX ORDER — CELECOXIB 100 MG/1
CAPSULE ORAL
COMMUNITY
Start: 2022-04-21 | End: 2022-11-21 | Stop reason: ALTCHOICE

## 2022-07-28 RX ORDER — LISDEXAMFETAMINE DIMESYLATE 70 MG/1
70 CAPSULE ORAL DAILY
Qty: 30 CAPSULE | Refills: 0 | Status: SHIPPED | OUTPATIENT
Start: 2022-07-28 | End: 2022-09-06 | Stop reason: SDUPTHER

## 2022-09-06 DIAGNOSIS — F98.8 ATTENTION DEFICIT DISORDER (ADD) WITHOUT HYPERACTIVITY: ICD-10-CM

## 2022-09-06 DIAGNOSIS — J45.40 MODERATE PERSISTENT ASTHMA WITHOUT COMPLICATION: ICD-10-CM

## 2022-09-06 RX ORDER — LISDEXAMFETAMINE DIMESYLATE 70 MG/1
70 CAPSULE ORAL DAILY
Qty: 30 CAPSULE | Refills: 0 | Status: SHIPPED | OUTPATIENT
Start: 2022-09-06 | End: 2022-10-05 | Stop reason: SDUPTHER

## 2022-09-06 RX ORDER — ALBUTEROL SULFATE 90 UG/1
2 AEROSOL, METERED RESPIRATORY (INHALATION) EVERY 4 HOURS PRN
Qty: 9 G | Refills: 0 | Status: SHIPPED | OUTPATIENT
Start: 2022-09-06

## 2022-10-05 DIAGNOSIS — F98.8 ATTENTION DEFICIT DISORDER (ADD) WITHOUT HYPERACTIVITY: ICD-10-CM

## 2022-10-05 RX ORDER — LISDEXAMFETAMINE DIMESYLATE 70 MG/1
70 CAPSULE ORAL DAILY
Qty: 30 CAPSULE | Refills: 0 | Status: SHIPPED | OUTPATIENT
Start: 2022-10-05

## 2022-10-20 ENCOUNTER — OFFICE VISIT (OUTPATIENT)
Dept: FAMILY MEDICINE CLINIC | Facility: HOSPITAL | Age: 42
End: 2022-10-20
Payer: COMMERCIAL

## 2022-10-20 VITALS
SYSTOLIC BLOOD PRESSURE: 118 MMHG | WEIGHT: 266.2 LBS | OXYGEN SATURATION: 98 % | HEART RATE: 78 BPM | TEMPERATURE: 97.6 F | BODY MASS INDEX: 37.27 KG/M2 | DIASTOLIC BLOOD PRESSURE: 64 MMHG | HEIGHT: 71 IN

## 2022-10-20 DIAGNOSIS — F98.8 ATTENTION DEFICIT DISORDER (ADD) WITHOUT HYPERACTIVITY: ICD-10-CM

## 2022-10-20 DIAGNOSIS — J45.40 MODERATE PERSISTENT ASTHMA WITHOUT COMPLICATION: Primary | ICD-10-CM

## 2022-10-20 PROCEDURE — 99214 OFFICE O/P EST MOD 30 MIN: CPT | Performed by: NURSE PRACTITIONER

## 2022-10-20 RX ORDER — MONTELUKAST SODIUM 10 MG/1
10 TABLET ORAL
Qty: 90 TABLET | Refills: 1 | Status: SHIPPED | OUTPATIENT
Start: 2022-10-20

## 2022-10-20 NOTE — PROGRESS NOTES
Assessment/Plan: Moderate persistent asthma without complication  Well controlled  Continue on current regimen  Restart Symbicort with increase in albuterol use, URI  F/U in 6 months  Attention deficit disorder (ADD) without hyperactivity  Well controlled  Continue on current regimen  F/U in 6 months  Diagnoses and all orders for this visit:    Moderate persistent asthma without complication  -     montelukast (SINGULAIR) 10 mg tablet; Take 1 tablet (10 mg total) by mouth daily at bedtime    Attention deficit disorder (ADD) without hyperactivity          Subjective:      Patient ID: Marla Cuevas is a 38 y o  male  Doing well  Breathing has been good  Will use albuterol before exercise otherwise not using  Currently not using Symbicort  Wants to remain on Singulair  Add is well controlled  Has been on Vyvanse for years  The following portions of the patient's history were reviewed and updated as appropriate: allergies, current medications, past family history, past medical history, past social history, past surgical history and problem list     Review of Systems   Constitutional: Negative for chills, fatigue and fever  HENT: Negative for congestion and rhinorrhea  Respiratory: Negative for cough, chest tightness, shortness of breath and wheezing  Psychiatric/Behavioral: Negative for decreased concentration  Objective:  Vitals:    10/20/22 1353   BP: 118/64   Pulse: 78   Temp: 97 6 °F (36 4 °C)   SpO2: 98%      Physical Exam  Vitals reviewed  Constitutional:       Appearance: Normal appearance  He is obese  Cardiovascular:      Rate and Rhythm: Normal rate and regular rhythm  Heart sounds: Normal heart sounds  No murmur heard  Pulmonary:      Effort: Pulmonary effort is normal       Breath sounds: Normal breath sounds  Skin:     General: Skin is warm and dry  Neurological:      Mental Status: He is alert and oriented to person, place, and time  Psychiatric:         Mood and Affect: Mood normal          Behavior: Behavior normal          Thought Content:  Thought content normal          Judgment: Judgment normal

## 2022-10-20 NOTE — ASSESSMENT & PLAN NOTE
Well controlled  Continue on current regimen  Restart Symbicort with increase in albuterol use, URI  F/U in 6 months

## 2022-11-07 DIAGNOSIS — F98.8 ATTENTION DEFICIT DISORDER (ADD) WITHOUT HYPERACTIVITY: ICD-10-CM

## 2022-11-07 RX ORDER — LISDEXAMFETAMINE DIMESYLATE 70 MG/1
70 CAPSULE ORAL DAILY
Qty: 30 CAPSULE | Refills: 0 | Status: SHIPPED | OUTPATIENT
Start: 2022-11-07

## 2022-11-21 ENCOUNTER — OFFICE VISIT (OUTPATIENT)
Dept: FAMILY MEDICINE CLINIC | Facility: HOSPITAL | Age: 42
End: 2022-11-21

## 2022-11-21 VITALS
HEIGHT: 71 IN | DIASTOLIC BLOOD PRESSURE: 68 MMHG | WEIGHT: 264 LBS | SYSTOLIC BLOOD PRESSURE: 120 MMHG | TEMPERATURE: 97.6 F | HEART RATE: 82 BPM | BODY MASS INDEX: 36.96 KG/M2 | OXYGEN SATURATION: 98 %

## 2022-11-21 DIAGNOSIS — Z01.818 PREOPERATIVE GENERAL PHYSICAL EXAMINATION: Primary | ICD-10-CM

## 2022-11-21 NOTE — PROGRESS NOTES
NAME: Ryan Reyes  AGE: 38 y o  SEX: male  : 1980     DATE: 2022    [facility] Pre-Operative Evaluation      Chief Complaint: Pre-operative Evaluation     Surgery: Right TRACI  Anticipated Date of Surgery: 2022  Referring Provider: Dr Lexii Frazier      History of Present Illness:     Ryan Reyes is a 38 y o  male who presents to the office today for a preoperative consultation at the request of surgeon,   [facility], who plans on performing Right TRACI on 2022  Planned anesthesia is general  Patient has a bleeding risk of: no recent abnormal bleeding  Patient does not have objections to receiving blood products if needed  Current anti-platelet/anti-coagulation medications that the patient is prescribed includes: NONE  Assessment of Chronic Conditions:   - Asthma: well controlled     Assessment of Cardiac Risk:  · Denies unstable or severe angina or MI in the last 6 weeks or history of stent placement in the last year   · Denies decompensated heart failure (e g  New onset heart failure, NYHA functional class IV heart failure, or worsening existing heart failure)  · Denies significant arrhythmias such as high grade AV block, symptomatic ventricular arrhythmia, newly recognized ventricular tachycardia, supraventricular tachycardia with resting heart rate >100, or symptomatic bradycardia  · Denies severe heart valve disease including aortic stenosis or symptomatic mitral stenosis     Exercise Capacity:  · Able to walk 4 blocks without symptoms?: Yes  · Able to walk 2 flights without symptoms?: Yes    Prior Anesthesia Reactions: No     Personal history of venous thromboembolic disease? No    History of steroid use for >2 weeks within last year? No         Review of Systems:     Review of Systems   Constitutional: Negative  Negative for activity change, appetite change, fatigue, fever and unexpected weight change  HENT: Negative    Negative for congestion, ear pain, sinus pressure, sinus pain, sore throat, trouble swallowing and voice change  Eyes: Negative  Negative for visual disturbance  Respiratory: Negative  Negative for cough, chest tightness, shortness of breath and wheezing  Cardiovascular: Negative  Negative for chest pain, palpitations and leg swelling  Gastrointestinal: Negative  Negative for abdominal pain, blood in stool, constipation, diarrhea, nausea, rectal pain and vomiting  Endocrine: Negative  Genitourinary: Negative  Negative for decreased urine volume, difficulty urinating, dysuria, frequency and urgency  Musculoskeletal: Positive for arthralgias  Negative for back pain, gait problem, joint swelling, myalgias, neck pain and neck stiffness  Skin: Negative  Negative for rash and wound  Neurological: Negative  Negative for dizziness, weakness, light-headedness, numbness and headaches  Hematological: Negative  Psychiatric/Behavioral: Negative  Negative for sleep disturbance  The patient is not nervous/anxious          Current Problem List:     Patient Active Problem List   Diagnosis   • Moderate persistent asthma without complication   • Attention deficit disorder (ADD) without hyperactivity   • Non-seasonal allergic rhinitis due to pollen   • Prolonged Q-T interval on ECG   • Low HDL (under 40)   • Primary osteoarthritis of one hip, right       Allergies:     No Known Allergies    Current Medications:       Current Outpatient Medications:   •  albuterol (PROVENTIL HFA,VENTOLIN HFA) 90 mcg/act inhaler, INHALE 2 PUFFS EVERY 4 HOURS AS NEEDED FOR WHEEZING OR SHORTNESS OF BREATH, Disp: 8 g, Rfl: 0  •  montelukast (SINGULAIR) 10 mg tablet, Take 1 tablet (10 mg total) by mouth daily at bedtime, Disp: 90 tablet, Rfl: 1  •  Vyvanse 70 MG capsule, Take 1 capsule (70 mg total) by mouth daily Max Daily Amount: 70 mg, Disp: 30 capsule, Rfl: 0  •  budesonide-formoterol (Symbicort) 80-4 5 MCG/ACT inhaler, Inhale 2 puffs 2 (two) times a day Rinse mouth after use, Disp: 10 2 g, Rfl: 5    Past Medical History:       Past Medical History:   Diagnosis Date   • Allergic         Past Surgical History:   Procedure Laterality Date   • HIP SURGERY     • KNEE SURGERY          Family History   Problem Relation Age of Onset   • Cancer Family    • Arthritis Family         Social History     Socioeconomic History   • Marital status: Single     Spouse name: Not on file   • Number of children: Not on file   • Years of education: Not on file   • Highest education level: Not on file   Occupational History   • Not on file   Tobacco Use   • Smoking status: Never   • Smokeless tobacco: Never   Vaping Use   • Vaping Use: Never used   Substance and Sexual Activity   • Alcohol use: Yes     Comment: rare   • Drug use: No   • Sexual activity: Not on file   Other Topics Concern   • Not on file   Social History Narrative   • Not on file     Social Determinants of Health     Financial Resource Strain: Not on file   Food Insecurity: Not on file   Transportation Needs: Not on file   Physical Activity: Not on file   Stress: Not on file   Social Connections: Not on file   Intimate Partner Violence: Not on file   Housing Stability: Not on file        Physical Exam:     /68 (BP Location: Left arm, Patient Position: Sitting, Cuff Size: Standard)   Pulse 82   Temp 97 6 °F (36 4 °C) (Tympanic)   Ht 5' 11" (1 803 m)   Wt 120 kg (264 lb)   SpO2 98%   BMI 36 82 kg/m²     Physical Exam  Vitals reviewed  Constitutional:       Appearance: Normal appearance  He is well-developed and well-nourished  HENT:      Head: Normocephalic and atraumatic  Right Ear: Tympanic membrane, ear canal and external ear normal       Left Ear: Ear canal and external ear normal       Nose: Nose normal       Mouth/Throat:      Mouth: Oropharynx is clear and moist       Pharynx: No oropharyngeal exudate  Eyes:      Conjunctiva/sclera: Conjunctivae normal       Pupils: Pupils are equal, round, and reactive to light  Neck:      Thyroid: No thyromegaly  Vascular: No carotid bruit  Cardiovascular:      Rate and Rhythm: Normal rate and regular rhythm  Heart sounds: Normal heart sounds  No murmur heard  Pulmonary:      Effort: Pulmonary effort is normal       Breath sounds: Normal breath sounds  Abdominal:      General: Abdomen is flat  Bowel sounds are normal       Palpations: Abdomen is soft  There is no hepatomegaly, splenomegaly or hepatosplenomegaly  Tenderness: There is no abdominal tenderness  Musculoskeletal:         General: Normal range of motion  Cervical back: Normal range of motion and neck supple  Lymphadenopathy:      Cervical: No cervical adenopathy  Skin:     General: Skin is warm and dry  Capillary Refill: Capillary refill takes less than 2 seconds  Neurological:      General: No focal deficit present  Mental Status: He is alert and oriented to person, place, and time  Deep Tendon Reflexes: Reflexes normal    Psychiatric:         Mood and Affect: Mood and affect and mood normal          Behavior: Behavior normal          Thought Content: Thought content normal          Judgment: Judgment normal           Data:     Pre-operative work-up    Laboratory Results: PAT to be done 11/29/22     EKG: N/A    Chest x-ray: N/A      Previous cardiopulmonary studies within the past year:  · Echocardiogram: N/A  · Cardiac Catheterization: N/A  · Stress Test: N/A  · Pulmonary Function Testing: N/A      Assessment & Recommendations:     1  Preoperative general physical examination            Pre-Op Evaluation Assessment  38 y o  male with planned surgery: Right TRACI  Known risk factors for perioperative complications: None  Current medications which may produce withdrawal symptoms if withheld perioperatively: none  Pre-Op Evaluation Plan  1  Further preoperative workup as follows:   - awaiting PAT to be done 11/29/22    2   Medication Management/Recommendations:   - None, continue medication regimen including morning of surgery, with sip of water    3  Prophylaxis for cardiac events with perioperative beta-blockers: not indicated  4  Patient requires further consultation with: None    Clearance  Patient is CLEARED for surgery without any additional cardiac testing  pending PAT to be done 11/29/22     Magaly France, [address] [facility] [address] [address]  Phone#  [phone]  Fax#  [phone]

## 2022-12-06 DIAGNOSIS — F98.8 ATTENTION DEFICIT DISORDER (ADD) WITHOUT HYPERACTIVITY: ICD-10-CM

## 2022-12-06 RX ORDER — LISDEXAMFETAMINE DIMESYLATE 70 MG/1
70 CAPSULE ORAL DAILY
Qty: 30 CAPSULE | Refills: 0 | Status: SHIPPED | OUTPATIENT
Start: 2022-12-06

## 2023-01-12 DIAGNOSIS — F98.8 ATTENTION DEFICIT DISORDER (ADD) WITHOUT HYPERACTIVITY: ICD-10-CM

## 2023-01-12 RX ORDER — LISDEXAMFETAMINE DIMESYLATE 70 MG/1
70 CAPSULE ORAL DAILY
Qty: 30 CAPSULE | Refills: 0 | Status: SHIPPED | OUTPATIENT
Start: 2023-01-12

## 2023-02-08 DIAGNOSIS — F98.8 ATTENTION DEFICIT DISORDER (ADD) WITHOUT HYPERACTIVITY: ICD-10-CM

## 2023-02-08 DIAGNOSIS — J45.40 MODERATE PERSISTENT ASTHMA WITHOUT COMPLICATION: ICD-10-CM

## 2023-02-08 RX ORDER — LISDEXAMFETAMINE DIMESYLATE 70 MG/1
70 CAPSULE ORAL DAILY
Qty: 30 CAPSULE | Refills: 0 | Status: SHIPPED | OUTPATIENT
Start: 2023-02-08

## 2023-02-08 RX ORDER — ALBUTEROL SULFATE 90 UG/1
2 AEROSOL, METERED RESPIRATORY (INHALATION) EVERY 4 HOURS PRN
Qty: 8 G | Refills: 0 | Status: SHIPPED | OUTPATIENT
Start: 2023-02-08

## 2023-03-11 ENCOUNTER — LAB (OUTPATIENT)
Dept: LAB | Facility: HOSPITAL | Age: 43
End: 2023-03-11

## 2023-03-11 DIAGNOSIS — Z13.220 SCREENING CHOLESTEROL LEVEL: ICD-10-CM

## 2023-03-11 DIAGNOSIS — Z13.0 SCREENING FOR ENDOCRINE, METABOLIC AND IMMUNITY DISORDER: ICD-10-CM

## 2023-03-11 DIAGNOSIS — Z13.228 SCREENING FOR ENDOCRINE, METABOLIC AND IMMUNITY DISORDER: ICD-10-CM

## 2023-03-11 DIAGNOSIS — Z13.29 SCREENING FOR ENDOCRINE, METABOLIC AND IMMUNITY DISORDER: ICD-10-CM

## 2023-03-11 DIAGNOSIS — Z11.59 ENCOUNTER FOR HEPATITIS C SCREENING TEST FOR LOW RISK PATIENT: ICD-10-CM

## 2023-03-11 DIAGNOSIS — Z11.4 SCREENING FOR HIV (HUMAN IMMUNODEFICIENCY VIRUS): ICD-10-CM

## 2023-03-11 LAB
ALBUMIN SERPL BCP-MCNC: 4.2 G/DL (ref 3.5–5)
ALP SERPL-CCNC: 54 U/L (ref 34–104)
ALT SERPL W P-5'-P-CCNC: 26 U/L (ref 7–52)
ANION GAP SERPL CALCULATED.3IONS-SCNC: 7 MMOL/L (ref 4–13)
AST SERPL W P-5'-P-CCNC: 22 U/L (ref 13–39)
BASOPHILS # BLD AUTO: 0.03 THOUSANDS/ÂΜL (ref 0–0.1)
BASOPHILS NFR BLD AUTO: 0 % (ref 0–1)
BILIRUB SERPL-MCNC: 0.56 MG/DL (ref 0.2–1)
BUN SERPL-MCNC: 13 MG/DL (ref 5–25)
CALCIUM SERPL-MCNC: 9.2 MG/DL (ref 8.4–10.2)
CHLORIDE SERPL-SCNC: 105 MMOL/L (ref 96–108)
CHOLEST SERPL-MCNC: 148 MG/DL
CO2 SERPL-SCNC: 27 MMOL/L (ref 21–32)
CREAT SERPL-MCNC: 0.81 MG/DL (ref 0.6–1.3)
EOSINOPHIL # BLD AUTO: 0.15 THOUSAND/ÂΜL (ref 0–0.61)
EOSINOPHIL NFR BLD AUTO: 2 % (ref 0–6)
ERYTHROCYTE [DISTWIDTH] IN BLOOD BY AUTOMATED COUNT: 13.2 % (ref 11.6–15.1)
GFR SERPL CREATININE-BSD FRML MDRD: 111 ML/MIN/1.73SQ M
GLUCOSE P FAST SERPL-MCNC: 68 MG/DL (ref 65–99)
HCT VFR BLD AUTO: 43.5 % (ref 36.5–49.3)
HDLC SERPL-MCNC: 39 MG/DL
HGB BLD-MCNC: 14.1 G/DL (ref 12–17)
IMM GRANULOCYTES # BLD AUTO: 0.07 THOUSAND/UL (ref 0–0.2)
IMM GRANULOCYTES NFR BLD AUTO: 1 % (ref 0–2)
LDLC SERPL CALC-MCNC: 86 MG/DL (ref 0–100)
LYMPHOCYTES # BLD AUTO: 2.41 THOUSANDS/ÂΜL (ref 0.6–4.47)
LYMPHOCYTES NFR BLD AUTO: 34 % (ref 14–44)
MCH RBC QN AUTO: 30.1 PG (ref 26.8–34.3)
MCHC RBC AUTO-ENTMCNC: 32.4 G/DL (ref 31.4–37.4)
MCV RBC AUTO: 93 FL (ref 82–98)
MONOCYTES # BLD AUTO: 0.52 THOUSAND/ÂΜL (ref 0.17–1.22)
MONOCYTES NFR BLD AUTO: 7 % (ref 4–12)
NEUTROPHILS # BLD AUTO: 3.88 THOUSANDS/ÂΜL (ref 1.85–7.62)
NEUTS SEG NFR BLD AUTO: 56 % (ref 43–75)
NONHDLC SERPL-MCNC: 109 MG/DL
NRBC BLD AUTO-RTO: 0 /100 WBCS
PLATELET # BLD AUTO: 244 THOUSANDS/UL (ref 149–390)
PMV BLD AUTO: 10.6 FL (ref 8.9–12.7)
POTASSIUM SERPL-SCNC: 4.1 MMOL/L (ref 3.5–5.3)
PROT SERPL-MCNC: 7 G/DL (ref 6.4–8.4)
RBC # BLD AUTO: 4.69 MILLION/UL (ref 3.88–5.62)
SODIUM SERPL-SCNC: 139 MMOL/L (ref 135–147)
TRIGL SERPL-MCNC: 115 MG/DL
WBC # BLD AUTO: 7.06 THOUSAND/UL (ref 4.31–10.16)

## 2023-03-12 LAB
HCV AB SER QL: NORMAL
HIV 1+2 AB+HIV1 P24 AG SERPL QL IA: NORMAL
HIV 2 AB SERPL QL IA: NORMAL
HIV1 AB SERPL QL IA: NORMAL
HIV1 P24 AG SERPL QL IA: NORMAL

## 2023-03-15 ENCOUNTER — OFFICE VISIT (OUTPATIENT)
Dept: FAMILY MEDICINE CLINIC | Facility: HOSPITAL | Age: 43
End: 2023-03-15

## 2023-03-15 VITALS
OXYGEN SATURATION: 97 % | SYSTOLIC BLOOD PRESSURE: 122 MMHG | TEMPERATURE: 97.1 F | HEIGHT: 71 IN | BODY MASS INDEX: 37.57 KG/M2 | WEIGHT: 268.4 LBS | DIASTOLIC BLOOD PRESSURE: 64 MMHG | HEART RATE: 97 BPM

## 2023-03-15 DIAGNOSIS — F98.8 ATTENTION DEFICIT DISORDER (ADD) WITHOUT HYPERACTIVITY: ICD-10-CM

## 2023-03-15 DIAGNOSIS — E78.6 LOW HDL (UNDER 40): ICD-10-CM

## 2023-03-15 DIAGNOSIS — J45.40 MODERATE PERSISTENT ASTHMA WITHOUT COMPLICATION: Primary | ICD-10-CM

## 2023-03-15 RX ORDER — MONTELUKAST SODIUM 10 MG/1
10 TABLET ORAL
Qty: 90 TABLET | Refills: 1 | Status: SHIPPED | OUTPATIENT
Start: 2023-03-15

## 2023-03-15 RX ORDER — LISDEXAMFETAMINE DIMESYLATE 70 MG/1
70 CAPSULE ORAL DAILY
Qty: 30 CAPSULE | Refills: 0 | Status: SHIPPED | OUTPATIENT
Start: 2023-03-15

## 2023-03-15 NOTE — ASSESSMENT & PLAN NOTE
HDL is improved but below goal    Discussed increasing dietary intake of Omega-3 fatty acids  Also recommend starting fish oil supplement  Recheck yearly

## 2023-03-15 NOTE — ASSESSMENT & PLAN NOTE
Well controlled  Not using Symbicort currently  Aware to go back on this if asthma symptoms increase  Continue with Singulair  F/U in 6 months

## 2023-03-15 NOTE — PROGRESS NOTES
Assessment/Plan: Moderate persistent asthma without complication  Well controlled  Not using Symbicort currently  Aware to go back on this if asthma symptoms increase  Continue with Singulair  F/U in 6 months  Attention deficit disorder (ADD) without hyperactivity  Well controlled  Continue on current regimen  F/U in 6 months  Low HDL (under 40)  HDL is improved but below goal    Discussed increasing dietary intake of Omega-3 fatty acids  Also recommend starting fish oil supplement  Recheck yearly  Diagnoses and all orders for this visit:    Moderate persistent asthma without complication  -     montelukast (SINGULAIR) 10 mg tablet; Take 1 tablet (10 mg total) by mouth daily at bedtime    Attention deficit disorder (ADD) without hyperactivity  -     Vyvanse 70 MG capsule; Take 1 capsule (70 mg total) by mouth daily Max Daily Amount: 70 mg    Low HDL (under 40)          Subjective:      Patient ID: Mitzy Salgado is a 38 y o  male  Having issues with albuterol inhaler clogging  Currently not using Symbicort  Taking Singulair  Breathing is good  Has not been exercising  S/p hip replacement  Plans on starting again  Diet has been good  Doing well on Vyvanse  The following portions of the patient's history were reviewed and updated as appropriate: allergies, current medications, past family history, past medical history, past social history, past surgical history and problem list     Review of Systems   Constitutional: Negative for fatigue and unexpected weight change  Eyes: Negative for visual disturbance  Respiratory: Negative for chest tightness, shortness of breath and wheezing  Cardiovascular: Negative for chest pain, palpitations and leg swelling  Neurological: Negative for dizziness, weakness, light-headedness, numbness and headaches  Psychiatric/Behavioral: Negative for decreased concentration, dysphoric mood and sleep disturbance   The patient is not nervous/anxious  Objective:  Vitals:    03/15/23 0755   BP: 122/64   Pulse: 97   Temp: (!) 97 1 °F (36 2 °C)   SpO2: 97%      Physical Exam  Vitals reviewed  Constitutional:       Appearance: Normal appearance  He is well-developed  He is obese  Cardiovascular:      Rate and Rhythm: Normal rate and regular rhythm  Pulses:           Carotid pulses are 2+ on the right side and 2+ on the left side  Heart sounds: Normal heart sounds  No murmur heard  Pulmonary:      Effort: Pulmonary effort is normal       Breath sounds: Normal breath sounds  Skin:     General: Skin is warm and dry  Neurological:      Mental Status: He is alert and oriented to person, place, and time  Psychiatric:         Mood and Affect: Mood normal          Behavior: Behavior normal          Thought Content:  Thought content normal          Judgment: Judgment normal

## 2023-05-16 DIAGNOSIS — F98.8 ATTENTION DEFICIT DISORDER (ADD) WITHOUT HYPERACTIVITY: ICD-10-CM

## 2023-05-16 RX ORDER — LISDEXAMFETAMINE DIMESYLATE 70 MG/1
70 CAPSULE ORAL DAILY
Qty: 30 CAPSULE | Refills: 0 | Status: SHIPPED | OUTPATIENT
Start: 2023-05-16

## 2023-05-18 DIAGNOSIS — J45.40 MODERATE PERSISTENT ASTHMA WITHOUT COMPLICATION: ICD-10-CM

## 2023-05-18 RX ORDER — ALBUTEROL SULFATE 90 UG/1
2 AEROSOL, METERED RESPIRATORY (INHALATION) EVERY 4 HOURS PRN
Qty: 8 G | Refills: 0 | Status: SHIPPED | OUTPATIENT
Start: 2023-05-18

## 2023-06-09 DIAGNOSIS — J45.40 MODERATE PERSISTENT ASTHMA WITHOUT COMPLICATION: ICD-10-CM

## 2023-06-09 RX ORDER — ALBUTEROL SULFATE 90 UG/1
AEROSOL, METERED RESPIRATORY (INHALATION)
Qty: 8.5 G | Refills: 3 | Status: SHIPPED | OUTPATIENT
Start: 2023-06-09

## 2023-06-15 DIAGNOSIS — F98.8 ATTENTION DEFICIT DISORDER (ADD) WITHOUT HYPERACTIVITY: ICD-10-CM

## 2023-06-15 RX ORDER — LISDEXAMFETAMINE DIMESYLATE 70 MG/1
70 CAPSULE ORAL DAILY
Qty: 30 CAPSULE | Refills: 0 | Status: SHIPPED | OUTPATIENT
Start: 2023-06-15

## 2023-07-10 DIAGNOSIS — F98.8 ATTENTION DEFICIT DISORDER (ADD) WITHOUT HYPERACTIVITY: ICD-10-CM

## 2023-07-10 NOTE — TELEPHONE ENCOUNTER
Medication Refill Request     Name Vyvanse  Dose/Frequency 70mg/1 capsule daily  Quantity 30  Verified pharmacy   [x]  Verified ordering Provider   [x]  Does patient have enough for the next 3 days?  Yes [x] No []

## 2023-07-11 RX ORDER — LISDEXAMFETAMINE DIMESYLATE 70 MG/1
70 CAPSULE ORAL DAILY
Qty: 30 CAPSULE | Refills: 0 | Status: SHIPPED | OUTPATIENT
Start: 2023-07-11 | End: 2023-08-16 | Stop reason: SDUPTHER

## 2023-07-28 NOTE — PROGRESS NOTES
Saji Delgadillo MD    NAME: Roland Masterson  AGE: 42 y o  SEX: male  : 1980     DATE: 2020     Assessment and Plan:     Problem List Items Addressed This Visit        Respiratory    Moderate persistent asthma without complication    Relevant Medications    albuterol (PROVENTIL HFA,VENTOLIN HFA) 90 mcg/act inhaler      Other Visit Diagnoses     Annual physical exam    -  Primary          Immunizations and preventive care screenings were discussed with patient today  Appropriate education was printed on patient's after visit summary  Counseling:  Alcohol/drug use: discussed moderation in alcohol intake, the recommendations for healthy alcohol use, and avoidance of illicit drug use  Dental Health: discussed importance of regular tooth brushing, flossing, and dental visits  Injury prevention: discussed safety/seat belts, safety helmets, smoke detectors, carbon dioxide detectors, and smoking near bedding or upholstery  Sexual health: discussed sexually transmitted diseases, partner selection, use of condoms, avoidance of unintended pregnancy, and contraceptive alternatives  · Exercise: the importance of regular exercise/physical activity was discussed  Recommend exercise 3-5 times per week for at least 30 minutes  BMI Counseling: Body mass index is 41 62 kg/m²  The BMI is above normal  Nutrition recommendations include decreasing portion sizes, encouraging healthy choices of fruits and vegetables, decreasing fast food intake, consuming healthier snacks, limiting drinks that contain sugar, moderation in carbohydrate intake and increasing intake of lean protein  Exercise recommendations include moderate physical activity 150 minutes/week  No pharmacotherapy was ordered  Return in about 1 year (around 2021) for Next scheduled follow up       Chief Complaint:     Chief Complaint   Patient presents with    Follow-up History of Present Illness:     Adult Annual Physical   Patient here for a comprehensive physical exam  The patient reports no problems  Has asthma  On Symbicort twice daily  Albuterol as needed  Needs more when active  Has allergies  Bad this morning  Also takes Allegra-D as needed  Otherwise does not have any cough, wheezing or sob  Had hip injected a few days ago  S/p fall down stairs a few months ago  Diet and Physical Activity  · Diet/Nutrition: well balanced diet  · Exercise: no formal exercise  Depression Screening  PHQ-9 Depression Screening    PHQ-9:    Frequency of the following problems over the past two weeks:       Little interest or pleasure in doing things:  0 - not at all  Feeling down, depressed, or hopeless:  0 - not at all  PHQ-2 Score:  0       General Health  · Sleep: sleeps well  · Hearing: normal - bilateral   · Vision: goes for regular eye exams and most recent eye exam >1 year ago  · Dental: regular dental visits   Health  · History of STDs?: no      Review of Systems:     Review of Systems   Constitutional: Negative  Negative for activity change, appetite change, fatigue, fever and unexpected weight change  HENT: Negative  Negative for congestion, ear pain, sinus pressure, sinus pain, sore throat, trouble swallowing and voice change  Eyes: Negative  Negative for visual disturbance  Respiratory: Negative  Negative for cough, chest tightness, shortness of breath and wheezing  Cardiovascular: Negative  Negative for chest pain, palpitations and leg swelling  Gastrointestinal: Negative  Negative for abdominal pain, blood in stool, constipation, diarrhea, nausea, rectal pain and vomiting  Endocrine: Negative  Genitourinary: Negative  Negative for decreased urine volume, difficulty urinating, dysuria, frequency and urgency  Musculoskeletal: Positive for arthralgias (hip pain)   Negative for back pain, gait problem, joint swelling, myalgias, neck pain and neck stiffness  Skin: Negative for rash and wound  Allergic/Immunologic: Positive for environmental allergies  Neurological: Negative  Negative for dizziness, weakness, light-headedness, numbness and headaches  Hematological: Negative  Psychiatric/Behavioral: Negative  Negative for sleep disturbance  The patient is not nervous/anxious         Past Medical History:     Past Medical History:   Diagnosis Date    Allergic       Past Surgical History:     Past Surgical History:   Procedure Laterality Date    HIP SURGERY      KNEE SURGERY        Social History:        Social History     Socioeconomic History    Marital status: Single     Spouse name: None    Number of children: None    Years of education: None    Highest education level: None   Occupational History    None   Social Needs    Financial resource strain: None    Food insecurity:     Worry: None     Inability: None    Transportation needs:     Medical: None     Non-medical: None   Tobacco Use    Smoking status: Never Smoker    Smokeless tobacco: Never Used   Substance and Sexual Activity    Alcohol use: No    Drug use: No    Sexual activity: None   Lifestyle    Physical activity:     Days per week: None     Minutes per session: None    Stress: None   Relationships    Social connections:     Talks on phone: None     Gets together: None     Attends Yazdanism service: None     Active member of club or organization: None     Attends meetings of clubs or organizations: None     Relationship status: None    Intimate partner violence:     Fear of current or ex partner: None     Emotionally abused: None     Physically abused: None     Forced sexual activity: None   Other Topics Concern    None   Social History Narrative    None      Family History:     Family History   Problem Relation Age of Onset    Cancer Family     Arthritis Family       Current Medications:     Current Outpatient Medications   Medication Sig Dispense Refill    albuterol (PROVENTIL HFA,VENTOLIN HFA) 90 mcg/act inhaler Inhale 2 puffs every 4 (four) hours as needed for wheezing 8 5 Inhaler 1    meloxicam (MOBIC) 15 mg tablet TAKE 1 TABLET BY MOUTH EVERY DAY 30 tablet 0    montelukast (SINGULAIR) 10 mg tablet TAKE 1 TABLET BY MOUTH EVERYDAY AT BEDTIME 90 tablet 1    SYMBICORT 80-4 5 MCG/ACT inhaler INHALE 2 PUFFS 2 TIMES A DAY RINSE MOUTH AFTER USE  10 2 Inhaler 0    VYVANSE 70 MG capsule Take 70 mg by mouth daily  0     No current facility-administered medications for this visit  Allergies:     No Known Allergies   Physical Exam:     /74 (BP Location: Left arm, Patient Position: Sitting, Cuff Size: Standard)   Pulse 78   Temp (!) 97 4 °F (36 3 °C) (Tympanic)   Ht 5' 11" (1 803 m)   Wt 135 kg (298 lb 6 4 oz)   SpO2 98%   BMI 41 62 kg/m²     Physical Exam   Constitutional: He is oriented to person, place, and time  He appears well-developed and well-nourished  HENT:   Head: Normocephalic and atraumatic  Right Ear: External ear normal    Left Ear: External ear normal    Nose: Nose normal    Mouth/Throat: Oropharynx is clear and moist  No oropharyngeal exudate  Eyes: Pupils are equal, round, and reactive to light  Conjunctivae are normal    Neck: Normal range of motion  Neck supple  No thyromegaly present  Cardiovascular: Normal rate, regular rhythm and normal heart sounds  No murmur heard  Pulmonary/Chest: Effort normal and breath sounds normal    Abdominal: Soft  Bowel sounds are normal  There is no hepatosplenomegaly  There is no tenderness  Musculoskeletal: Normal range of motion  Lymphadenopathy:     He has no cervical adenopathy  Neurological: He is alert and oriented to person, place, and time  He displays normal reflexes  Skin: Skin is warm and dry  Capillary refill takes less than 2 seconds  Psychiatric: He has a normal mood and affect   His behavior is normal  Judgment and thought content normal    Vitals reviewed        Omari Holman MD Male

## 2023-08-16 DIAGNOSIS — F98.8 ATTENTION DEFICIT DISORDER (ADD) WITHOUT HYPERACTIVITY: ICD-10-CM

## 2023-08-16 RX ORDER — LISDEXAMFETAMINE DIMESYLATE 70 MG/1
70 CAPSULE ORAL DAILY
Qty: 30 CAPSULE | Refills: 0 | Status: SHIPPED | OUTPATIENT
Start: 2023-08-16

## 2023-08-30 DIAGNOSIS — F98.8 ATTENTION DEFICIT DISORDER (ADD) WITHOUT HYPERACTIVITY: ICD-10-CM

## 2023-08-30 RX ORDER — LISDEXAMFETAMINE DIMESYLATE 70 MG/1
70 CAPSULE ORAL DAILY
Qty: 30 CAPSULE | Refills: 0 | Status: SHIPPED | OUTPATIENT
Start: 2023-08-30

## 2023-08-30 NOTE — TELEPHONE ENCOUNTER
Hi, how's it going? My name is Misael Hayes. I'm calling for [employer] [facility] [employer]. She might need to give me a call back on this one, but I wanted to put in a request for my Vyvanse 70 milligrams. I just got a refill not too long ago, but the last time I got a refill to [facility] only had 15 in stock and would have taken another week. So I just took 15 and they said to go ahead and order it early so they have it on something scheduled. So there's not a lot of it would be the [facility] on [address] and means just now it's [birth year] Vyvanse 70 milligrams. Give me a call if you have any questions, [phone], again [phone]. Thank you.

## 2023-09-27 ENCOUNTER — OFFICE VISIT (OUTPATIENT)
Dept: FAMILY MEDICINE CLINIC | Facility: HOSPITAL | Age: 43
End: 2023-09-27
Payer: COMMERCIAL

## 2023-09-27 VITALS
TEMPERATURE: 97.4 F | WEIGHT: 281.6 LBS | HEART RATE: 74 BPM | BODY MASS INDEX: 39.42 KG/M2 | SYSTOLIC BLOOD PRESSURE: 122 MMHG | OXYGEN SATURATION: 97 % | DIASTOLIC BLOOD PRESSURE: 76 MMHG | HEIGHT: 71 IN

## 2023-09-27 DIAGNOSIS — J45.40 MODERATE PERSISTENT ASTHMA WITHOUT COMPLICATION: ICD-10-CM

## 2023-09-27 DIAGNOSIS — E78.6 LOW HDL (UNDER 40): ICD-10-CM

## 2023-09-27 DIAGNOSIS — Z12.83 SCREENING EXAM FOR SKIN CANCER: ICD-10-CM

## 2023-09-27 DIAGNOSIS — F98.8 ATTENTION DEFICIT DISORDER (ADD) WITHOUT HYPERACTIVITY: ICD-10-CM

## 2023-09-27 DIAGNOSIS — Z00.00 ANNUAL PHYSICAL EXAM: Primary | ICD-10-CM

## 2023-09-27 PROCEDURE — 99396 PREV VISIT EST AGE 40-64: CPT | Performed by: NURSE PRACTITIONER

## 2023-09-27 PROCEDURE — 99214 OFFICE O/P EST MOD 30 MIN: CPT | Performed by: NURSE PRACTITIONER

## 2023-09-27 RX ORDER — ALBUTEROL SULFATE 90 UG/1
2 AEROSOL, METERED RESPIRATORY (INHALATION) EVERY 4 HOURS PRN
Qty: 8.5 G | Refills: 1 | Status: SHIPPED | OUTPATIENT
Start: 2023-09-27

## 2023-09-27 NOTE — PROGRESS NOTES
Assessment/Plan: Moderate persistent asthma without complication  Well controlled. Off Symbicort for now. Discussed restarting with albuterol use > 2x/week. Continue on Singulair. F/U in 6 months. Attention deficit disorder (ADD) without hyperactivity  Issues with obtaining Vyvanse due to nation wide shortage. He will be requesting refills 15 days early as suggested by pharmacy. It will not be filled until 5 days prior to next refill date. Diagnoses and all orders for this visit:    Annual physical exam    Moderate persistent asthma without complication  -     CBC and differential; Future  -     Comprehensive metabolic panel; Future  -     albuterol (PROVENTIL HFA,VENTOLIN HFA) 90 mcg/act inhaler; Inhale 2 puffs every 4 (four) hours as needed for wheezing or shortness of breath    Attention deficit disorder (ADD) without hyperactivity    Low HDL (under 40)  -     Lipid panel; Future    Moderate persistent asthma without complication  Comments:  Controlled on current regimen. Continue and f/u in 1 year. Call with increase sob, wheezing, cough. Orders:  -     CBC and differential; Future  -     Comprehensive metabolic panel; Future  -     albuterol (PROVENTIL HFA,VENTOLIN HFA) 90 mcg/act inhaler; Inhale 2 puffs every 4 (four) hours as needed for wheezing or shortness of breath    Screening exam for skin cancer  -     Ambulatory Referral to Dermatology; Future          Subjective:      Patient ID: Unknown Casey is a 41 y.o. male. Asthma symptoms have been controlled. Rare use of albuterol. He does report mechanical issues with albuterol inhaler. Does not feel he gets all the actuations and has to use them to prime etc. He is not taking Symbicort. He is taking Singulair. Year round allergies typically worse in fall but does not feel they are overly bothersome. Does have some eye symptoms. Issues with getting Vyvanse refills due to shortage.  His pharmacy recommended he get refill sent 15 days prior in order for them to order it. It will be held until 5 days prior to previous refill date. He does have control of symptoms with the Vyvanse. The following portions of the patient's history were reviewed and updated as appropriate: allergies, current medications, past family history, past medical history, past social history, past surgical history and problem list.    Review of Systems   Constitutional:  Negative for appetite change. Respiratory:  Negative for cough, chest tightness, shortness of breath and wheezing. Psychiatric/Behavioral:  Negative for agitation, decreased concentration, dysphoric mood and sleep disturbance. The patient is not nervous/anxious. Objective:  Vitals:    09/27/23 0741   BP: 122/76   Pulse: 74   Temp: (!) 97.4 °F (36.3 °C)   SpO2: 97%      Physical Exam  Vitals reviewed. Constitutional:       Appearance: Normal appearance. Cardiovascular:      Rate and Rhythm: Normal rate and regular rhythm. Heart sounds: Normal heart sounds. No murmur heard. Pulmonary:      Effort: Pulmonary effort is normal.      Breath sounds: Normal breath sounds. Skin:     General: Skin is warm and dry. Neurological:      Mental Status: He is alert and oriented to person, place, and time. Psychiatric:         Mood and Affect: Mood normal.         Behavior: Behavior normal.         Thought Content:  Thought content normal.         Judgment: Judgment normal.

## 2023-09-27 NOTE — PROGRESS NOTES
[address] [facility]. [address]     NAME: Jake Ford  AGE: 41 y.o. SEX: male  : 1980     DATE: 2023     Assessment and Plan:     Problem List Items Addressed This Visit          Respiratory    Moderate persistent asthma without complication     Well controlled. Off Symbicort for now. Discussed restarting with albuterol use > 2x/week. Continue on Singulair. F/U in 6 months. Relevant Medications    albuterol (PROVENTIL HFA,VENTOLIN HFA) 90 mcg/act inhaler    Other Relevant Orders    CBC and differential    Comprehensive metabolic panel       Other    Attention deficit disorder (ADD) without hyperactivity     Issues with obtaining Vyvanse due to nation wide shortage. He will be requesting refills 15 days early as suggested by pharmacy. It will not be filled until 5 days prior to next refill date. Low HDL (under 40)    Relevant Orders    Lipid panel     Other Visit Diagnoses       Annual physical exam    -  Primary    Screening exam for skin cancer        Relevant Orders    Ambulatory Referral to Dermatology              Immunizations and preventive care screenings were discussed with patient today. Appropriate education was printed on patient's after visit summary. Counseling:  Alcohol/drug use: discussed moderation in alcohol intake, the recommendations for healthy alcohol use, and avoidance of illicit drug use. Dental Health: discussed importance of regular tooth brushing, flossing, and dental visits. Injury prevention: discussed safety/seat belts, safety helmets, smoke detectors, carbon dioxide detectors, and smoking near bedding or upholstery. Sexual health: discussed sexually transmitted diseases, partner selection, use of condoms, avoidance of unintended pregnancy, and contraceptive alternatives. Exercise: the importance of regular exercise/physical activity was discussed.  Recommend exercise 3-5 times per week for at least 30 minutes. BMI Counseling: Body mass index is 39.28 kg/m². The BMI is above normal. Nutrition recommendations include decreasing portion sizes, encouraging healthy choices of fruits and vegetables, decreasing fast food intake, consuming healthier snacks, limiting drinks that contain sugar, moderation in carbohydrate intake, increasing intake of lean protein, reducing intake of saturated and trans fat and reducing intake of cholesterol. Exercise recommendations include moderate physical activity 150 minutes/week. No pharmacotherapy was ordered. Rationale for BMI follow-up plan is due to patient being overweight or obese. Return in about 6 months (around 3/27/2024) for Next scheduled follow up. Chief Complaint:     Chief Complaint   Patient presents with    Annual Exam      History of Present Illness:     Adult Annual Physical   Patient here for a comprehensive physical exam. The patient reports no problems. Diet and Physical Activity  Diet/Nutrition: poor diet. Exercise: no formal exercise. Depression Screening  PHQ-2/9 Depression Screening           General Health  Sleep: sleeps well. Hearing: normal - bilateral.  Vision: goes for regular eye exams and wears glasses. Dental: regular dental visits.  Health  Symptoms include: urinary hesitancy and nocturia     Review of Systems:     Review of Systems   Constitutional: Negative. Negative for activity change, appetite change, fatigue, fever and unexpected weight change. HENT: Negative. Negative for congestion, ear pain, sinus pressure, sinus pain, sore throat, trouble swallowing and voice change. Eyes: Negative. Negative for visual disturbance. Respiratory: Negative. Negative for cough, chest tightness, shortness of breath and wheezing. Cardiovascular: Negative. Negative for chest pain, palpitations and leg swelling. Gastrointestinal: Negative.   Negative for abdominal pain, blood in stool, constipation, diarrhea, nausea, rectal pain and vomiting. Endocrine: Negative. Genitourinary: Negative. Negative for decreased urine volume, difficulty urinating, dysuria, frequency and urgency. Musculoskeletal: Negative. Negative for arthralgias and myalgias. Skin: Negative. Negative for rash and wound. Allergic/Immunologic: Positive for environmental allergies. Neurological: Negative. Negative for dizziness, weakness, light-headedness, numbness and headaches. Hematological: Negative. Psychiatric/Behavioral: Negative. Negative for sleep disturbance. The patient is not nervous/anxious.        Past Medical History:     Past Medical History:   Diagnosis Date    Allergic       Past Surgical History:     Past Surgical History:   Procedure Laterality Date    HIP SURGERY      KNEE SURGERY        Family History:     Family History   Problem Relation Age of Onset    Cancer Family     Arthritis Family       Social History:     Social History     Socioeconomic History    Marital status: Single     Spouse name: None    Number of children: None    Years of education: None    Highest education level: None   Occupational History    None   Tobacco Use    Smoking status: Never    Smokeless tobacco: Never   Vaping Use    Vaping Use: Never used   Substance and Sexual Activity    Alcohol use: Yes     Comment: rare    Drug use: No    Sexual activity: None   Other Topics Concern    None   Social History Narrative    None     Social Determinants of Health     Financial Resource Strain: Not on file   Food Insecurity: Not on file   Transportation Needs: Not on file   Physical Activity: Not on file   Stress: Not on file   Social Connections: Not on file   Intimate Partner Violence: Not on file   Housing Stability: Not on file      Current Medications:     Current Outpatient Medications   Medication Sig Dispense Refill    albuterol (PROVENTIL HFA,VENTOLIN HFA) 90 mcg/act inhaler Inhale 2 puffs every 4 (four) hours as needed for wheezing or shortness of breath 8.5 g 1    budesonide-formoterol (Symbicort) 80-4.5 MCG/ACT inhaler Inhale 2 puffs 2 (two) times a day Rinse mouth after use 10.2 g 5    montelukast (SINGULAIR) 10 mg tablet Take 1 tablet (10 mg total) by mouth daily at bedtime 90 tablet 1    Vyvanse 70 MG capsule Take 1 capsule (70 mg total) by mouth daily Max Daily Amount: 70 mg 30 capsule 0     No current facility-administered medications for this visit. Allergies:     No Known Allergies   Physical Exam:     /76 (BP Location: Left arm, Patient Position: Sitting, Cuff Size: Standard)   Pulse 74   Temp (!) 97.4 °F (36.3 °C) (Tympanic)   Ht 5' 11" (1.803 m)   Wt 128 kg (281 lb 9.6 oz)   SpO2 97%   BMI 39.28 kg/m²     Physical Exam  Vitals reviewed. Constitutional:       Appearance: Normal appearance. HENT:      Head: Normocephalic and atraumatic. Right Ear: Tympanic membrane, ear canal and external ear normal.      Left Ear: Tympanic membrane, ear canal and external ear normal.      Nose: Nose normal.      Mouth/Throat:      Mouth: Mucous membranes are moist.      Pharynx: Oropharynx is clear. Eyes:      Conjunctiva/sclera: Conjunctivae normal.      Pupils: Pupils are equal, round, and reactive to light. Neck:      Thyroid: No thyromegaly. Cardiovascular:      Rate and Rhythm: Normal rate and regular rhythm. Heart sounds: Normal heart sounds. No murmur heard. Pulmonary:      Effort: Pulmonary effort is normal.      Breath sounds: Normal breath sounds. Abdominal:      General: Abdomen is flat. Bowel sounds are normal.      Palpations: Abdomen is soft. There is no hepatomegaly or splenomegaly. Tenderness: There is no abdominal tenderness. Musculoskeletal:         General: Normal range of motion. Cervical back: Normal range of motion and neck supple. Skin:     General: Skin is warm and dry. Capillary Refill: Capillary refill takes less than 2 seconds. Neurological:      General: No focal deficit present. Mental Status: He is alert and oriented to person, place, and time. Psychiatric:         Mood and Affect: Mood normal.         Behavior: Behavior normal.         Thought Content:  Thought content normal.         Judgment: Judgment normal.          Josh Cohen, [address]

## 2023-09-27 NOTE — ASSESSMENT & PLAN NOTE
Issues with obtaining Vyvanse due to nation wide shortage. He will be requesting refills 15 days early as suggested by pharmacy. It will not be filled until 5 days prior to next refill date.
Well controlled. Off Symbicort for now. Discussed restarting with albuterol use > 2x/week. Continue on Singulair. F/U in 6 months.
Yes

## 2023-10-25 DIAGNOSIS — F98.8 ATTENTION DEFICIT DISORDER (ADD) WITHOUT HYPERACTIVITY: ICD-10-CM

## 2023-10-26 RX ORDER — LISDEXAMFETAMINE DIMESYLATE 70 MG/1
70 CAPSULE ORAL DAILY
Qty: 30 CAPSULE | Refills: 0 | Status: SHIPPED | OUTPATIENT
Start: 2023-10-26

## 2023-11-15 DIAGNOSIS — J45.40 MODERATE PERSISTENT ASTHMA WITHOUT COMPLICATION: ICD-10-CM

## 2023-11-15 RX ORDER — ALBUTEROL SULFATE 90 UG/1
AEROSOL, METERED RESPIRATORY (INHALATION)
Qty: 8.5 G | Refills: 1 | Status: SHIPPED | OUTPATIENT
Start: 2023-11-15

## 2023-11-28 DIAGNOSIS — F98.8 ATTENTION DEFICIT DISORDER (ADD) WITHOUT HYPERACTIVITY: ICD-10-CM

## 2023-11-28 RX ORDER — LISDEXAMFETAMINE DIMESYLATE 70 MG/1
70 CAPSULE ORAL DAILY
Qty: 30 CAPSULE | Refills: 0 | Status: SHIPPED | OUTPATIENT
Start: 2023-11-28

## 2023-12-15 NOTE — DISCHARGE INSTRUCTIONS
ED Attending Physician Note    Teaching/Supervisory Statement:  I performed a history and physical exam of the patient, reviewed all labs and imaging and discussed plan of care with the resident. I reviewed the resident's note and agree with the documented findings and management plan. Please see resident documentation for any further specific documentation of bedside procedures, consultant conversations or other tasks that were performed by the resident under my supervision.     Patient : Nasim Najera Age: 86 year old Sex: male   MRN: [id number] Encounter Date: 12/14/2023      History     Chief Complaint   Patient presents with    Abdominal Pain       HPI  Patient is an 86-year-old male with past medical history significant for CAD s/p remote PCI, HTN, HLD, DM, lumbar spondylosis c/b chronic back pain, irregular bowel habits x yrs (saw Dr. Cross in 2020 - colonoscopy as below) here for evlauation of \"2 months\" of irregular bowel habits (intermittent constipation and loose stools) as well as abdominal distension and unintentional weight loss. Patient is Aox3, satting on RA, NSR on tele and in no distress.  He is quite pleasant and talkative and provides full history.  He has sensorineural hearing loss and is primary Bulgarian speaking-fluent ED  used for translation.  In brief it sounds if patient has had longstanding history of irregular bowel movements, saw GI back in 2020 and had a colonoscopy that was unremarkable.  Has not followed up with GI recently and gets most of his care through his PCP.  He is here for concerns about 2 months of recurrent irregular bowel movements-states that he is sometimes constipated and sometimes has diarrhea.  No black or bloody stools.  Endorsing abdominal distention but no significant abdominal pain.  States his abdomen feels \"full.\"  He states that he has not had any nausea or vomiting.  He is asking about whether or not we can test for H. pylori but denies any epigastric pain  Steroid Joint Injection   WHAT YOU NEED TO KNOW:   A steroid joint injection is a procedure to inject steroid medicine into a joint  Steroid medicine decreases pain and inflammation  The injection may also contain an anesthetic (numbing medicine) to decrease pain  It may be done to treat conditions such as arthritis, gout, or carpal tunnel syndrome  The injections may be given in your knee, ankle, shoulder, elbow, wrist, ankle or sacroiliac joint  1  Do not apply heat to any area that is numb  If you have discomfort or soreness at the injection site, you may apply ice today, 20 minutes on and 20 minutes off  Tomorrow you may use ice or warm, moist heat  Do not apply ice or heat directly to the skin  2  You may have an increase or change in the discomfort for 36-48 hours after your treatment  Apply ice and continue with any pain medicine you have been prescribed  3  Do not do anything strenuous today  You may shower, but no tub baths or hot tubs today  You may resume your normal activities tomorrow, but do not overdo it  Resume normal activities slowly when you are feeling better  4  If you experience redness, drainage or swelling at the injection site, or if you develop a fever above 100 degrees, please call [facility] at [phone] or go to the Emergency Room  5  Continue to take all routine medicines prescribed by your primary care physician unless otherwise instructed by our staff  Most blood thinners should be started again according to your regularly scheduled dosing  If you have any questions, please give our office a call  If you have a problem specifically related to your procedure, please call our office at [phone]  Problems not related to your procedure should be directed to your primary care physician  or tenderness.  No postprandial symptoms.  No major abdominal surgeries in the past.  Has chronic back pain but no change in the back pain.  Does endorse some generalized weakness that he states that is going on for at least a year.  Also endorsing some burning with urination that he states has been ongoing for several months as well.  No black or bloody stools.  No hematuria.  No history of recurrent UTI.  No flank pain.  No fevers or chills.  No known history of autoimmune disease or formal diagnosis of IBS          1/30/23 Colonoscopy (Dr. Cross)  FINDINGS: Normal-appearing colon except for some stool  retention.   No Known Allergies    There are no discharge medications for this patient.      There are no discharge medications for this patient.      Past Medical History:   Diagnosis Date    Diabetes mellitus (CMD)     Essential (primary) hypertension     High cholesterol        No past surgical history on file.    No family history on file.         Review of Systems  Constitutional:  No fever, no chills.   Skin:  No rash  ENMT:  No sore throat   Respiratory:  No cough    Cardiovascular: no chest pain, no unilateral LE swelling  Gastrointestinal: no diarrhea    Genitourinary: No hematuria.    Neurologic: no dizziness    Hematologic/Lymphatic: no abnormal bruising  Musculoskeletal: no joint swelling.  Additional review of systems information: All other systems reviewed and otherwise negative.     Physical Exam     ED Triage Vitals [12/14/23 1438]   ED Triage Vitals Group      Temp 98.1 °F (36.7 °C)      Heart Rate 66      Resp 18      /72      SpO2 94 %      EtCO2 mmHg       Height       Weight       Weight Scale Used       BMI (Calculated)       IBW/kg (Calculated)        Physical Exam  General: Patient is alert. No apparent distress.    Skin: warm, dry, no lacerations    Heme: no petechiae   Eyes: The eyes are anicteric, pupils equal and reactive, EOMI.    HEENT: normal MM, no jaw malocclusion   CV: Normal  rhythm, No gallops or rubs.  Cap refill is <3 seconds in all extremities.     Pulm: Respirations are regular and unlabored with clear lungs bilaterally.     GI: The abdomen is soft, nontender, +mild distension - no rebound/guarding rigidity  +mildly uncomfortable to deep palpation diffusely  Back: no midline spinal tenderness, no stepoffs  : deferred  Extremities: Normal ROM, no deformity, strength equal throughout   Neuro: Grossly normal tone. Alert, moving all extremities spontaneously    ED Course     Procedures    Lab Results     Results for orders placed or performed during the hospital encounter of 12/14/23   Comprehensive Metabolic Panel   Result Value Ref Range    Fasting Status      Sodium 137 135 - 145 mmol/L    Potassium 4.7 3.4 - 5.1 mmol/L    Chloride 103 97 - 110 mmol/L    Carbon Dioxide 33 (H) 21 - 32 mmol/L    Anion Gap 6 (L) 7 - 19 mmol/L    Glucose 109 (H) 70 - 99 mg/dL    BUN 14 6 - 20 mg/dL    Creatinine 0.99 0.67 - 1.17 mg/dL    Glomerular Filtration Rate 75 >=60    BUN/Cr 14 7 - 25    Calcium 9.4 8.4 - 10.2 mg/dL    Bilirubin, Total 0.5 0.2 - 1.0 mg/dL    GOT/AST 14 <=37 Units/L    GPT/ALT 17 <64 Units/L    Alkaline Phosphatase 89 45 - 117 Units/L    Albumin 3.5 (L) 3.6 - 5.1 g/dL    Protein, Total 8.4 (H) 6.4 - 8.2 g/dL    Globulin 4.9 (H) 2.0 - 4.0 g/dL    A/G Ratio 0.7 (L) 1.0 - 2.4   Lipase   Result Value Ref Range    Lipase 27 15 - 77 Units/L   CBC with Automated Differential (performable only)   Result Value Ref Range    WBC 3.9 (L) 4.2 - 11.0 K/mcL    RBC 5.14 4.50 - 5.90 mil/mcL    HGB 14.4 13.0 - 17.0 g/dL    HCT 46.0 39.0 - 51.0 %    MCV 89.5 78.0 - 100.0 fl    MCH 28.0 26.0 - 34.0 pg    MCHC 31.3 (L) 32.0 - 36.5 g/dL    RDW-CV 15.2 (H) 11.0 - 15.0 %    RDW-SD 49.8 39.0 - 50.0 fL     140 - 450 K/mcL    NRBC 0 <=0 /100 WBC    Neutrophil, Percent 50 %    Lymphocytes, Percent 32 %    Mono, Percent 14 %    Eosinophils, Percent 4 %    Basophils, Percent 0 %    Immature  Granulocytes 0 %    Absolute Neutrophils 2.0 1.8 - 7.7 K/mcL    Absolute Lymphocytes 1.3 1.0 - 4.0 K/mcL    Absolute Monocytes 0.6 0.3 - 0.9 K/mcL    Absolute Eosinophils  0.1 0.0 - 0.5 K/mcL    Absolute Basophils 0.0 0.0 - 0.3 K/mcL    Absolute Immature Granulocytes 0.0 0.0 - 0.2 K/mcL       EKG Results     Encounter Date: 12/14/23   Electrocardiogram 12-Lead   Result Value    Ventricular Rate EKG/Min (BPM) 72    Atrial Rate (BPM) 72    ID-Interval (MSEC) 142    QRS-Interval (MSEC) 84    QT-Interval (MSEC) 360    QTc 394    P Axis (Degrees) 38    R Axis (Degrees) 50    T Axis (Degrees) 31    REPORT TEXT      Normal sinus rhythm  with sinus arrhythmia  Normal ECG  Confirmed by LANNY TUCKER MD ([id number]) on 12/14/2023 9:24:37 PM         Radiology Results     CT ABDOMEN PELVIS W CONTRAST - IV contrast only    (Results Pending)        ED Medication Orders (From admission, onward)      None            Medical Decision Making        IV was established.  Pulse oximetry on room air is interpreted as normal.   Cardiac telemetry tracing by my interpretation: sinus rhythm     Pt is nontoxic appearing, without evidence of peritonitis on exam - thus low concern for emergent abdominal pathologies such as SBO, perforated viscus, ischemic colitis or mesenteric ischemia, AAA, cholangitis, intraperitoneal bleeding, bacterial peritonitis    Extremely low concern for aortic dissection as pt has no severe chest pain radiating to back, no focal neuro deficits, no history of aortic pathology or connective tissue disease, no assymetric pulses.                  The pt was masked on arrival  Myself and the nurses wore masks, eye protection and gloves for this encounter due to COVID-19 pandemic  Clinical Impression     ED Diagnosis   1. Generalized abdominal pain            Disposition        There is no disposition no dispo time  There is no comment      I have reviewed all diagnostic test results and discussed them and their implications  with the patient and/or family/friends that are available.  The patient and/or their family had the opportunity to ask questions all of which were answered to their satisfaction.     Dominic Schwartz MD  Anesthesia Critical Care  Emergency Medicine  12/14/2023 10:17 PM                        Isaac Schwartz MD  12/14/23 [id number]

## 2023-12-28 DIAGNOSIS — F98.8 ATTENTION DEFICIT DISORDER (ADD) WITHOUT HYPERACTIVITY: ICD-10-CM

## 2023-12-28 RX ORDER — LISDEXAMFETAMINE DIMESYLATE 70 MG/1
70 CAPSULE ORAL DAILY
Qty: 30 CAPSULE | Refills: 0 | Status: SHIPPED | OUTPATIENT
Start: 2023-12-28

## 2024-01-22 DIAGNOSIS — F98.8 ATTENTION DEFICIT DISORDER (ADD) WITHOUT HYPERACTIVITY: ICD-10-CM

## 2024-01-22 RX ORDER — LISDEXAMFETAMINE DIMESYLATE CAPSULES 70 MG/1
70 CAPSULE ORAL DAILY
Qty: 30 CAPSULE | Refills: 0 | Status: SHIPPED | OUTPATIENT
Start: 2024-01-22

## 2024-02-23 DIAGNOSIS — J45.40 MODERATE PERSISTENT ASTHMA WITHOUT COMPLICATION: ICD-10-CM

## 2024-02-23 RX ORDER — ALBUTEROL SULFATE 90 UG/1
2 AEROSOL, METERED RESPIRATORY (INHALATION) EVERY 4 HOURS PRN
Qty: 8.5 G | Refills: 1 | Status: SHIPPED | OUTPATIENT
Start: 2024-02-23

## 2024-02-28 DIAGNOSIS — F98.8 ATTENTION DEFICIT DISORDER (ADD) WITHOUT HYPERACTIVITY: ICD-10-CM

## 2024-02-28 RX ORDER — LISDEXAMFETAMINE DIMESYLATE CAPSULES 70 MG/1
70 CAPSULE ORAL DAILY
Qty: 30 CAPSULE | Refills: 0 | Status: SHIPPED | OUTPATIENT
Start: 2024-02-28

## 2024-03-06 DIAGNOSIS — F98.8 ATTENTION DEFICIT DISORDER (ADD) WITHOUT HYPERACTIVITY: ICD-10-CM

## 2024-03-06 DIAGNOSIS — F98.8 ATTENTION DEFICIT DISORDER (ADD) WITHOUT HYPERACTIVITY: Primary | ICD-10-CM

## 2024-03-06 RX ORDER — LISDEXAMFETAMINE DIMESYLATE 70 MG/1
70 CAPSULE ORAL EVERY MORNING
Qty: 30 CAPSULE | Refills: 0 | OUTPATIENT
Start: 2024-03-06

## 2024-03-06 RX ORDER — LISDEXAMFETAMINE DIMESYLATE 70 MG/1
70 CAPSULE ORAL DAILY
Qty: 30 CAPSULE | Refills: 0 | Status: SHIPPED | OUTPATIENT
Start: 2024-03-06

## 2024-03-27 ENCOUNTER — OFFICE VISIT (OUTPATIENT)
Dept: FAMILY MEDICINE CLINIC | Facility: HOSPITAL | Age: 44
End: 2024-03-27
Payer: COMMERCIAL

## 2024-03-27 VITALS
DIASTOLIC BLOOD PRESSURE: 68 MMHG | HEIGHT: 71 IN | SYSTOLIC BLOOD PRESSURE: 122 MMHG | TEMPERATURE: 97.8 F | BODY MASS INDEX: 39.06 KG/M2 | HEART RATE: 72 BPM | WEIGHT: 279 LBS

## 2024-03-27 DIAGNOSIS — J45.40 MODERATE PERSISTENT ASTHMA WITHOUT COMPLICATION: Primary | ICD-10-CM

## 2024-03-27 DIAGNOSIS — F98.8 ATTENTION DEFICIT DISORDER (ADD) WITHOUT HYPERACTIVITY: ICD-10-CM

## 2024-03-27 PROCEDURE — 99213 OFFICE O/P EST LOW 20 MIN: CPT | Performed by: NURSE PRACTITIONER

## 2024-03-27 NOTE — PROGRESS NOTES
"Assessment/Plan:    Moderate persistent asthma without complication  Well controlled.   Off Symbicort.   Advise he start Singulair daily as we head into height of allergy season.   F/U in 6 months    Attention deficit disorder (ADD) without hyperactivity  Symptoms are controlled with Vyvanse.   He continues to have difficulty obtaining due to shortage.   Continue on current regimen.        Diagnoses and all orders for this visit:    Moderate persistent asthma without complication    Attention deficit disorder (ADD) without hyperactivity          Subjective:      Patient ID: Shade Mauro is a 43 y.o. male.    Uses albuterol inhaler prior to exercise. Does not use it outside of that. He is not using Symbicort. Has been using Singulair as needed. He reports spring and fall allergies. Allergy to grass and pollen.   Still having difficulty obtaining Vyvanse. He does note feeling \"hung over\" when he misses 2 days. He would like to eventually come off of this.         The following portions of the patient's history were reviewed and updated as appropriate: allergies, current medications, past family history, past medical history, past social history, past surgical history and problem list.    Review of Systems   Respiratory:  Negative for cough, chest tightness, shortness of breath and wheezing.    Psychiatric/Behavioral:  Negative for decreased concentration.          Objective:  Vitals:    03/27/24 0735   BP: 122/68   Pulse: 72   Temp: 97.8 °F (36.6 °C)      Physical Exam  Vitals reviewed.   Constitutional:       Appearance: Normal appearance. He is well-developed.   Cardiovascular:      Rate and Rhythm: Normal rate and regular rhythm.      Pulses:           Carotid pulses are 2+ on the right side and 2+ on the left side.     Heart sounds: Normal heart sounds. No murmur heard.  Pulmonary:      Effort: Pulmonary effort is normal.      Breath sounds: Normal breath sounds.   Skin:     General: Skin is warm and dry. "   Neurological:      Mental Status: He is alert and oriented to person, place, and time.   Psychiatric:         Mood and Affect: Mood normal.         Behavior: Behavior normal.         Thought Content: Thought content normal.         Judgment: Judgment normal.

## 2024-04-04 DIAGNOSIS — F98.8 ATTENTION DEFICIT DISORDER (ADD) WITHOUT HYPERACTIVITY: ICD-10-CM

## 2024-04-04 RX ORDER — LISDEXAMFETAMINE DIMESYLATE 70 MG/1
70 CAPSULE ORAL DAILY
Qty: 30 CAPSULE | Refills: 0 | Status: SHIPPED | OUTPATIENT
Start: 2024-04-04

## 2024-05-08 DIAGNOSIS — F98.8 ATTENTION DEFICIT DISORDER (ADD) WITHOUT HYPERACTIVITY: ICD-10-CM

## 2024-05-08 RX ORDER — LISDEXAMFETAMINE DIMESYLATE 70 MG/1
70 CAPSULE ORAL DAILY
Qty: 30 CAPSULE | Refills: 0 | Status: SHIPPED | OUTPATIENT
Start: 2024-05-08

## 2024-06-06 DIAGNOSIS — F98.8 ATTENTION DEFICIT DISORDER (ADD) WITHOUT HYPERACTIVITY: ICD-10-CM

## 2024-06-06 RX ORDER — LISDEXAMFETAMINE DIMESYLATE 70 MG/1
70 CAPSULE ORAL DAILY
Qty: 30 CAPSULE | Refills: 0 | Status: SHIPPED | OUTPATIENT
Start: 2024-06-06

## 2024-06-21 ENCOUNTER — TELEPHONE (OUTPATIENT)
Dept: OTHER | Facility: HOSPITAL | Age: 44
End: 2024-06-21

## 2024-06-21 NOTE — TELEPHONE ENCOUNTER
Patient called requesting refill for Albuterol.  Patient made aware medication was refilled on 02/23/24 for 8.5g with 1 refills to [facility] pharmacy. Patient instructed to contact the pharmacy to obtain refills of medication. Patient verbalized understanding.

## 2024-07-12 DIAGNOSIS — F98.8 ATTENTION DEFICIT DISORDER (ADD) WITHOUT HYPERACTIVITY: ICD-10-CM

## 2024-07-12 NOTE — TELEPHONE ENCOUNTER
Reason for call:   [x] Refill   [] Prior Auth  [] Other:     Office:   [x] PCP/Provider - Paulina Bello  [] Specialty/Provider -     Medication: Vyvanse    Dose/Frequency: 70 mg Daily     Quantity: 30    Pharmacy: [facility] [place] [facility]    Does the patient have enough for 3 days?   [x] Yes   [] No - Send as HP to POD

## 2024-07-14 RX ORDER — LISDEXAMFETAMINE DIMESYLATE 70 MG/1
70 CAPSULE ORAL DAILY
Qty: 30 CAPSULE | Refills: 0 | Status: SHIPPED | OUTPATIENT
Start: 2024-07-14

## 2024-08-14 NOTE — TELEPHONE ENCOUNTER
Patient is trying to locate Vyvanse 70mg. His preferred pharmacy said they have not had this medication in stock in months. He will contact office when he finds which pharmacy has medication.

## 2024-08-15 DIAGNOSIS — F98.8 ATTENTION DEFICIT DISORDER (ADD) WITHOUT HYPERACTIVITY: ICD-10-CM

## 2024-08-15 DIAGNOSIS — J45.40 MODERATE PERSISTENT ASTHMA WITHOUT COMPLICATION: ICD-10-CM

## 2024-08-15 RX ORDER — LISDEXAMFETAMINE DIMESYLATE 70 MG/1
70 CAPSULE ORAL DAILY
Qty: 30 CAPSULE | Refills: 0 | Status: SHIPPED | OUTPATIENT
Start: 2024-08-15

## 2024-08-15 NOTE — TELEPHONE ENCOUNTER
Reason for call:   [x] Refill   [] Prior Auth  [x] Other: not a duplicate, wants different pharmacy    Office:   [x] PCP/Provider - NUPUR Aguilar   [] Specialty/Provider -     Medication:     Vyvanse 70 MG capsule       Dose/Frequency:     Take 1 capsule (70 mg total) by mouth daily Max Daily Amount: 70 mg       Quantity: 30    Pharmacy: [facility]/pharmacy #[id number] - [place], [place] - [address] [place] [address] [phone]     Does the patient have enough for 3 days?   [] Yes   [x] No - Send as HP to POD

## 2024-08-16 RX ORDER — ALBUTEROL SULFATE 90 UG/1
AEROSOL, METERED RESPIRATORY (INHALATION)
Qty: 8.5 G | Refills: 1 | Status: SHIPPED | OUTPATIENT
Start: 2024-08-16

## 2024-08-26 NOTE — TELEPHONE ENCOUNTER
Patient called requesting refill for Vyvanse 70 MG capsule  . Patient made aware medication was refilled on 8/15/2024 for 30 capsules with 0 refills to [facility]. Patient instructed to contact the pharmacy to obtain refills of medication. Patient verbalized understanding.

## 2024-11-01 ENCOUNTER — TELEPHONE (OUTPATIENT)
Age: 44
End: 2024-11-01

## 2024-11-01 DIAGNOSIS — F98.8 ATTENTION DEFICIT DISORDER (ADD) WITHOUT HYPERACTIVITY: ICD-10-CM

## 2024-11-01 NOTE — TELEPHONE ENCOUNTER
Patient called the RX Refill Line. Message is being forwarded to the office.     Patient states that he went to go get labwork done and when he went they said that there were no scripts in the system. Would like updated labs place so he can get labwork done.     Please contact patient at [phone] to let him know they are ordered.

## 2024-11-01 NOTE — TELEPHONE ENCOUNTER
Reason for call:   [x] Refill   [] Prior Auth  [] Other:     Office:   [] PCP/Provider - Paulina Bello/ [facility] [address]      [x] Specialty/Provider -    Medication: Vyvanse    Dose/Frequency: 70 mg    Quantity: #30    Pharmacy: [address]    Does the patient have enough for 3 days?   [] Yes   [x] No - Send as HP to POD

## 2024-11-04 DIAGNOSIS — Z13.220 SCREENING CHOLESTEROL LEVEL: Primary | ICD-10-CM

## 2024-11-04 DIAGNOSIS — Z13.0 SCREENING FOR ENDOCRINE, METABOLIC AND IMMUNITY DISORDER: ICD-10-CM

## 2024-11-04 DIAGNOSIS — Z13.29 SCREENING FOR ENDOCRINE, METABOLIC AND IMMUNITY DISORDER: ICD-10-CM

## 2024-11-04 DIAGNOSIS — Z13.228 SCREENING FOR ENDOCRINE, METABOLIC AND IMMUNITY DISORDER: ICD-10-CM

## 2024-11-04 RX ORDER — LISDEXAMFETAMINE DIMESYLATE 70 MG/1
70 CAPSULE ORAL DAILY
Qty: 30 CAPSULE | Refills: 0 | Status: SHIPPED | OUTPATIENT
Start: 2024-11-04

## 2024-11-04 NOTE — TELEPHONE ENCOUNTER
Labs ordered although they are not due until March. He can have then done but needs to f/u. I do not see anything scheduled.

## 2024-12-03 DIAGNOSIS — F98.8 ATTENTION DEFICIT DISORDER (ADD) WITHOUT HYPERACTIVITY: ICD-10-CM

## 2024-12-03 DIAGNOSIS — J45.40 MODERATE PERSISTENT ASTHMA WITHOUT COMPLICATION: ICD-10-CM

## 2024-12-03 NOTE — TELEPHONE ENCOUNTER
Reason for call:   [x] Refill   [] Prior Auth  [] Other:     Office:   [x] PCP/Provider -   [] Specialty/Provider -     Medication:   Albuterol 90 mcg inhaler- inhale 2 puffs every 4 hours as needed for wheezing  Vyvanse 70 MG- take 1 capsule by mouth daily      Pharmacy: [facility] [place]    Does the patient have enough for 3 days?   [x] Yes   [] No - Send as HP to POD

## 2024-12-04 RX ORDER — LISDEXAMFETAMINE DIMESYLATE 70 MG/1
70 CAPSULE ORAL DAILY
Qty: 30 CAPSULE | Refills: 0 | Status: SHIPPED | OUTPATIENT
Start: 2024-12-04

## 2024-12-04 RX ORDER — ALBUTEROL SULFATE 90 UG/1
2 INHALANT RESPIRATORY (INHALATION) EVERY 4 HOURS PRN
Qty: 25.5 G | Refills: 1 | Status: SHIPPED | OUTPATIENT
Start: 2024-12-04

## 2024-12-12 ENCOUNTER — OFFICE VISIT (OUTPATIENT)
Dept: FAMILY MEDICINE CLINIC | Facility: HOSPITAL | Age: 44
End: 2024-12-12
Payer: COMMERCIAL

## 2024-12-12 VITALS
HEIGHT: 71 IN | OXYGEN SATURATION: 98 % | TEMPERATURE: 97 F | SYSTOLIC BLOOD PRESSURE: 124 MMHG | HEART RATE: 92 BPM | DIASTOLIC BLOOD PRESSURE: 76 MMHG | BODY MASS INDEX: 38.75 KG/M2 | WEIGHT: 276.8 LBS

## 2024-12-12 DIAGNOSIS — N50.811 PAIN IN BOTH TESTICLES: ICD-10-CM

## 2024-12-12 DIAGNOSIS — R39.11 BENIGN PROSTATIC HYPERPLASIA WITH URINARY HESITANCY: Primary | ICD-10-CM

## 2024-12-12 DIAGNOSIS — N50.812 PAIN IN BOTH TESTICLES: ICD-10-CM

## 2024-12-12 DIAGNOSIS — N52.1 ERECTILE DYSFUNCTION DUE TO DISEASES CLASSIFIED ELSEWHERE: ICD-10-CM

## 2024-12-12 DIAGNOSIS — N40.1 BENIGN PROSTATIC HYPERPLASIA WITH URINARY HESITANCY: Primary | ICD-10-CM

## 2024-12-12 PROCEDURE — 99214 OFFICE O/P EST MOD 30 MIN: CPT | Performed by: NURSE PRACTITIONER

## 2024-12-12 NOTE — PROGRESS NOTES
"Name: Shade Mauro      : 1980      MRN: [id number]  Encounter Provider: NUPUR Aguilar  Encounter Date: 2024   Encounter department: [facility] [address]   :  Assessment & Plan  Benign prostatic hyperplasia with urinary hesitancy  His symptoms are consistent with BPH.   Given relatively young age and progression of symptoms will check blood work.   Refer to urology.   Orders:    PSA, total and free; Future    CBC and differential; Future    Comprehensive metabolic panel; Future    UA w Reflex to Microscopic w Reflex to Culture; Future    Ambulatory Referral to Urology; Future    Erectile dysfunction due to diseases classified elsewhere  R/O organic cause.   May need prn medication vs daily Cialis.   Orders:    Testosterone, free, total; Future    Ambulatory Referral to Urology; Future    Pain in both testicles  Check US and refer to urology.   Orders:    US scrotum and groin area; Future    Ambulatory Referral to Urology; Future           History of Present Illness     Having difficulty urinating. Hesitancy and weak stream. Dull pain with arousal. Small ejaculate. Hard to keep erection. No nocturia. Has been going on for a few months. Not getting better. Seems to be getting worse. Dull ache left side of abdomen. Pain is both testicles with erection. Unsure of family history. No fever or chills. Has increased frequency. Denies risk of STD.       Review of Systems   Constitutional:  Negative for chills and fever.   Gastrointestinal:  Positive for abdominal pain.   Genitourinary:  Positive for decreased urine volume, difficulty urinating, frequency and testicular pain. Negative for dysuria, hematuria, penile discharge, penile pain, penile swelling and scrotal swelling.       Objective   /76 (Patient Position: Sitting, Cuff Size: Standard)   Pulse 92   Temp (!) 97 °F (36.1 °C) (Tympanic)   Ht 5' 11\" (1.803 m)   Wt 126 kg (276 lb 12.8 oz)   SpO2 98%   BMI 38.61 " kg/m²      Physical Exam  Vitals reviewed.   Constitutional:       Appearance: Normal appearance. He is obese.   Cardiovascular:      Rate and Rhythm: Normal rate and regular rhythm.      Heart sounds: Normal heart sounds. No murmur heard.  Pulmonary:      Effort: Pulmonary effort is normal.      Breath sounds: Normal breath sounds.   Abdominal:      General: Abdomen is flat. Bowel sounds are normal.      Palpations: Abdomen is soft. There is no hepatomegaly or splenomegaly.      Tenderness: There is no abdominal tenderness.   Genitourinary:     Prostate: Enlarged. Not tender and no nodules present.      Rectum: No tenderness.   Skin:     General: Skin is warm and dry.   Neurological:      Mental Status: He is alert and oriented to person, place, and time.   Psychiatric:         Mood and Affect: Mood normal.         Behavior: Behavior normal.         Thought Content: Thought content normal.         Judgment: Judgment normal.

## 2024-12-13 ENCOUNTER — HOSPITAL ENCOUNTER (OUTPATIENT)
Dept: RADIOLOGY | Facility: HOSPITAL | Age: 44
Discharge: HOME | End: 2024-12-13
Payer: COMMERCIAL

## 2024-12-13 ENCOUNTER — TELEPHONE (OUTPATIENT)
Age: 44
End: 2024-12-13

## 2024-12-13 DIAGNOSIS — N50.812 PAIN IN BOTH TESTICLES: ICD-10-CM

## 2024-12-13 DIAGNOSIS — N50.811 PAIN IN BOTH TESTICLES: ICD-10-CM

## 2024-12-13 PROCEDURE — 76870 US EXAM SCROTUM: CPT

## 2024-12-13 NOTE — TELEPHONE ENCOUNTER
New Patient    What is the reason for the patient’s appointment?: Benign prostatic hyperplasia with urinary hesitancy, Erectile dysfunction due to diseases classified elsewhere, Pain in both testicles       What office location does the patient prefer?: any location     Does patient have Imaging/Lab Results: pt will have US 12/13/24 and labs will be completed 12/16/24    Have patient records been requested?: records will be in epic   If No, are the records showing in Epic:            normal

## 2024-12-16 ENCOUNTER — RESULTS FOLLOW-UP (OUTPATIENT)
Dept: FAMILY MEDICINE CLINIC | Facility: HOSPITAL | Age: 44
End: 2024-12-16

## 2024-12-16 ENCOUNTER — APPOINTMENT (OUTPATIENT)
Dept: LAB | Facility: HOSPITAL | Age: 44
End: 2024-12-16
Payer: COMMERCIAL

## 2024-12-16 DIAGNOSIS — Z13.0 SCREENING FOR ENDOCRINE, METABOLIC AND IMMUNITY DISORDER: ICD-10-CM

## 2024-12-16 DIAGNOSIS — N52.1 ERECTILE DYSFUNCTION DUE TO DISEASES CLASSIFIED ELSEWHERE: ICD-10-CM

## 2024-12-16 DIAGNOSIS — Z13.228 SCREENING FOR ENDOCRINE, METABOLIC AND IMMUNITY DISORDER: ICD-10-CM

## 2024-12-16 DIAGNOSIS — R39.11 BENIGN PROSTATIC HYPERPLASIA WITH URINARY HESITANCY: ICD-10-CM

## 2024-12-16 DIAGNOSIS — Z13.220 SCREENING CHOLESTEROL LEVEL: ICD-10-CM

## 2024-12-16 DIAGNOSIS — Z13.29 SCREENING FOR ENDOCRINE, METABOLIC AND IMMUNITY DISORDER: ICD-10-CM

## 2024-12-16 DIAGNOSIS — N40.1 BENIGN PROSTATIC HYPERPLASIA WITH URINARY HESITANCY: ICD-10-CM

## 2024-12-16 LAB
ALBUMIN SERPL BCG-MCNC: 4.5 G/DL (ref 3.5–5)
ALP SERPL-CCNC: 58 U/L (ref 34–104)
ALT SERPL W P-5'-P-CCNC: 32 U/L (ref 7–52)
ANION GAP SERPL CALCULATED.3IONS-SCNC: 7 MMOL/L (ref 4–13)
AST SERPL W P-5'-P-CCNC: 23 U/L (ref 13–39)
BASOPHILS # BLD AUTO: 0.04 THOUSANDS/ÂΜL (ref 0–0.1)
BASOPHILS NFR BLD AUTO: 1 % (ref 0–1)
BILIRUB SERPL-MCNC: 0.61 MG/DL (ref 0.2–1)
BILIRUB UR QL STRIP: NEGATIVE
BUN SERPL-MCNC: 14 MG/DL (ref 5–25)
CALCIUM SERPL-MCNC: 9.1 MG/DL (ref 8.4–10.2)
CHLORIDE SERPL-SCNC: 102 MMOL/L (ref 96–108)
CHOLEST SERPL-MCNC: 170 MG/DL (ref ?–200)
CLARITY UR: CLEAR
CO2 SERPL-SCNC: 29 MMOL/L (ref 21–32)
COLOR UR: YELLOW
CREAT SERPL-MCNC: 0.92 MG/DL (ref 0.6–1.3)
EOSINOPHIL # BLD AUTO: 0.22 THOUSAND/ÂΜL (ref 0–0.61)
EOSINOPHIL NFR BLD AUTO: 3 % (ref 0–6)
ERYTHROCYTE [DISTWIDTH] IN BLOOD BY AUTOMATED COUNT: 12.7 % (ref 11.6–15.1)
GFR SERPL CREATININE-BSD FRML MDRD: 102 ML/MIN/1.73SQ M
GLUCOSE P FAST SERPL-MCNC: 98 MG/DL (ref 65–99)
GLUCOSE UR STRIP-MCNC: NEGATIVE MG/DL
HCT VFR BLD AUTO: 45.5 % (ref 36.5–49.3)
HDLC SERPL-MCNC: 45 MG/DL
HGB BLD-MCNC: 15.3 G/DL (ref 12–17)
HGB UR QL STRIP.AUTO: NEGATIVE
IMM GRANULOCYTES # BLD AUTO: 0.07 THOUSAND/UL (ref 0–0.2)
IMM GRANULOCYTES NFR BLD AUTO: 1 % (ref 0–2)
KETONES UR STRIP-MCNC: NEGATIVE MG/DL
LDLC SERPL CALC-MCNC: 98 MG/DL (ref 0–100)
LEUKOCYTE ESTERASE UR QL STRIP: NEGATIVE
LYMPHOCYTES # BLD AUTO: 2.3 THOUSANDS/ÂΜL (ref 0.6–4.47)
LYMPHOCYTES NFR BLD AUTO: 29 % (ref 14–44)
MCH RBC QN AUTO: 30.9 PG (ref 26.8–34.3)
MCHC RBC AUTO-ENTMCNC: 33.6 G/DL (ref 31.4–37.4)
MCV RBC AUTO: 92 FL (ref 82–98)
MONOCYTES # BLD AUTO: 0.51 THOUSAND/ÂΜL (ref 0.17–1.22)
MONOCYTES NFR BLD AUTO: 6 % (ref 4–12)
NEUTROPHILS # BLD AUTO: 4.78 THOUSANDS/ÂΜL (ref 1.85–7.62)
NEUTS SEG NFR BLD AUTO: 60 % (ref 43–75)
NITRITE UR QL STRIP: NEGATIVE
NONHDLC SERPL-MCNC: 125 MG/DL
NRBC BLD AUTO-RTO: 0 /100 WBCS
PH UR STRIP.AUTO: 6.5 [PH]
PLATELET # BLD AUTO: 246 THOUSANDS/UL (ref 149–390)
PMV BLD AUTO: 10.2 FL (ref 8.9–12.7)
POTASSIUM SERPL-SCNC: 3.9 MMOL/L (ref 3.5–5.3)
PROT SERPL-MCNC: 7.5 G/DL (ref 6.4–8.4)
PROT UR STRIP-MCNC: NEGATIVE MG/DL
PSA FREE MFR SERPL: 12.23 %
PSA FREE SERPL-MCNC: 0.24 NG/ML
PSA SERPL-MCNC: 2 NG/ML (ref 0–4)
RBC # BLD AUTO: 4.95 MILLION/UL (ref 3.88–5.62)
SODIUM SERPL-SCNC: 138 MMOL/L (ref 135–147)
SP GR UR STRIP.AUTO: 1.02 (ref 1–1.03)
TRIGL SERPL-MCNC: 134 MG/DL (ref ?–150)
UROBILINOGEN UR STRIP-ACNC: <2 MG/DL
WBC # BLD AUTO: 7.92 THOUSAND/UL (ref 4.31–10.16)

## 2024-12-16 PROCEDURE — 84153 ASSAY OF PSA TOTAL: CPT

## 2024-12-16 PROCEDURE — 80053 COMPREHEN METABOLIC PANEL: CPT

## 2024-12-16 PROCEDURE — 36415 COLL VENOUS BLD VENIPUNCTURE: CPT

## 2024-12-16 PROCEDURE — 84403 ASSAY OF TOTAL TESTOSTERONE: CPT

## 2024-12-16 PROCEDURE — 84402 ASSAY OF FREE TESTOSTERONE: CPT

## 2024-12-16 PROCEDURE — 80061 LIPID PANEL: CPT

## 2024-12-16 PROCEDURE — 85025 COMPLETE CBC W/AUTO DIFF WBC: CPT

## 2024-12-16 PROCEDURE — 81003 URINALYSIS AUTO W/O SCOPE: CPT

## 2024-12-16 PROCEDURE — 84154 ASSAY OF PSA FREE: CPT

## 2024-12-19 LAB
TESTOST FREE SERPL-MCNC: 11.7 PG/ML (ref 6.8–21.5)
TESTOST SERPL-MCNC: 488 NG/DL (ref 264–916)

## 2024-12-26 NOTE — TELEPHONE ENCOUNTER
Patient is calling back, I read the message to him in its entirety, he verbalized understanding and has no further questions

## 2024-12-27 ENCOUNTER — OFFICE VISIT (OUTPATIENT)
Age: 44
End: 2024-12-27
Payer: COMMERCIAL

## 2024-12-27 VITALS
DIASTOLIC BLOOD PRESSURE: 90 MMHG | SYSTOLIC BLOOD PRESSURE: 134 MMHG | HEIGHT: 71 IN | OXYGEN SATURATION: 96 % | WEIGHT: 278 LBS | BODY MASS INDEX: 38.92 KG/M2 | HEART RATE: 90 BPM

## 2024-12-27 DIAGNOSIS — R39.11 BENIGN PROSTATIC HYPERPLASIA WITH URINARY HESITANCY: Primary | ICD-10-CM

## 2024-12-27 DIAGNOSIS — N50.812 PAIN IN BOTH TESTICLES: ICD-10-CM

## 2024-12-27 DIAGNOSIS — N40.1 BENIGN PROSTATIC HYPERPLASIA WITH URINARY HESITANCY: Primary | ICD-10-CM

## 2024-12-27 DIAGNOSIS — N52.1 ERECTILE DYSFUNCTION DUE TO DISEASES CLASSIFIED ELSEWHERE: ICD-10-CM

## 2024-12-27 DIAGNOSIS — N50.811 PAIN IN BOTH TESTICLES: ICD-10-CM

## 2024-12-27 LAB — POST-VOID RESIDUAL VOLUME, ML POC: 27 ML

## 2024-12-27 PROCEDURE — 51798 US URINE CAPACITY MEASURE: CPT | Performed by: PHYSICIAN ASSISTANT

## 2024-12-27 PROCEDURE — 99203 OFFICE O/P NEW LOW 30 MIN: CPT | Performed by: PHYSICIAN ASSISTANT

## 2024-12-27 RX ORDER — TADALAFIL 5 MG/1
5 TABLET ORAL DAILY PRN
Qty: 90 TABLET | Refills: 3 | Status: SHIPPED | OUTPATIENT
Start: 2024-12-27

## 2024-12-27 RX ORDER — SILDENAFIL 100 MG/1
100 TABLET, FILM COATED ORAL DAILY PRN
Qty: 30 TABLET | Refills: 3 | Status: SHIPPED | OUTPATIENT
Start: 2024-12-27

## 2024-12-27 NOTE — PROGRESS NOTES
UROLOGY CONSULTATION NOTE     Patient Identifiers: Shade Mauro (MRN: [id number])  Service Requesting Consultation:      NUPUR Aguilar     Service Providing Consultation:  Urology, Jeffrey Samaniego PA-C  Consults  Date of Service: 12/27/2024    Reason for Consultation: Orchialgia and lower urinary tract symptoms    History of Present Illness:     Shade Mauro is a 44 y.o. male referred from primary care for urologic evaluation.  He has several issues to review.  First he had a scrotal ultrasound due to orchialgia.  He reports having a dull soreness in the testicles during arousal with also some lower abdominal discomfort.  He has trouble maintaining his erection.  Testosterone was 488.  He also reports difficulty starting his urinary stream occasionally.  PVR is 27.  PSA 2.0.  No burning.  No significant urinary frequency or nocturia.  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  He is on Vyvanse for ADHD.    Past Medical, Past Surgical History:     Past Medical History:   Diagnosis Date    Allergic    :    Past Surgical History:   Procedure Laterality Date    HIP SURGERY      KNEE SURGERY     :    Medications, Allergies:     Current Outpatient Medications:     albuterol (PROVENTIL HFA,VENTOLIN HFA) 90 mcg/act inhaler, Inhale 2 puffs every 4 (four) hours as needed for wheezing (for wheezing or shortness of breath), Disp: 25.5 g, Rfl: 1    budesonide-formoterol (Symbicort) 80-4.5 MCG/ACT inhaler, Inhale 2 puffs 2 (two) times a day Rinse mouth after use, Disp: 10.2 g, Rfl: 5    montelukast (SINGULAIR) 10 mg tablet, Take 1 tablet (10 mg total) by mouth daily at bedtime, Disp: 90 tablet, Rfl: 1    sildenafil (VIAGRA) 100 mg tablet, Take 1 tablet (100 mg total) by mouth daily as needed for erectile dysfunction, Disp: 30 tablet, Rfl: 3    tadalafil (CIALIS) 5 MG tablet, Take 1 tablet (5 mg total) by mouth daily as needed for erectile dysfunction, Disp: 90 tablet, Rfl: 3    Vyvanse 70  "MG capsule, Take 1 capsule (70 mg total) by mouth daily Max Daily Amount: 70 mg, Disp: 30 capsule, Rfl: 0    Allergies:  No Known Allergies:    Social and Family History:   Social History:   Social History     Tobacco Use    Smoking status: Never    Smokeless tobacco: Never   Vaping Use    Vaping status: Never Used   Substance Use Topics    Alcohol use: Yes     Comment: rare    Drug use: No   .    Social History     Tobacco Use   Smoking Status Never   Smokeless Tobacco Never       Family History:  Family History   Problem Relation Age of Onset    Cancer Family     Arthritis Family    :     Review of Systems:     General: Fever, chills, or night sweats: negative  Cardiac: Negative for chest pain.    Pulmonary: Negative for shortness of breath.  Gastrointestinal: Abdominal pain negative.  Nausea, vomiting, or diarrhea negative,  Genitourinary: See HPI above.  Patient does not have hematuria.  All other systems queried were negative.    Physical Exam:   General: Patient is pleasant and in NAD. Awake and alert  /90 (BP Location: Left arm, Patient Position: Sitting, Cuff Size: Adult)   Pulse 90   Ht 5' 11\" (1.803 m)   Wt 126 kg (278 lb)   SpO2 96%   BMI 38.77 kg/m²   HEENT:  Conjunctiva are clear  Constitutional:  pleasant and cooperative     no apparent distress  Cardiac: Peripheral edema: negative  Pulmonary: Non-labored breathing  Abdomen: Soft, non-tender, non-distended.  No surgical scars.  No masses, tenderness, hernias noted.    Genitourinary: Negative CVA tenderness, negative suprapubic tenderness.  Extremities:  Moves all extremities  Neurological:CNII-XII intact. No numbness or tingling. Essentially non focal neurologic exam  Psychiatric:mood affect and behavior normal    (Male): Penis circumcised, phallus normal, meatus patent.  Testicles descended into scrotum bilaterally without masses nor tenderness.  No inguinal hernias bilaterally.  TATYANA: Prostate is enlarged at 35 grams. The prostate is not " boggy. The prostate is not tender.  No nodules noted.      Labs:     Lab Results   Component Value Date    HGB 15.3 12/16/2024    HCT 45.5 12/16/2024    WBC 7.92 12/16/2024     12/16/2024   ]    Lab Results   Component Value Date    K 3.9 12/16/2024     12/16/2024    CO2 29 12/16/2024    BUN 14 12/16/2024    CREATININE 0.92 12/16/2024    CALCIUM 9.1 12/16/2024   ]    Imaging:   I personally reviewed the images and report of the following studies, and reviewed them with the patient:  IMPRESSION:     1. Mild bilateral microlithiasis is present without intratesticular mass or other worrisome findings.  In the absence of any other risk factors for testicular cancer (e.g., personal history of testicular cancer, father or brother with testicular cancer,   history of cryptorchidism for maldescent, testicular atrophy, or other risk factors), no further imaging or biochemical follow-up is necessary; all that is recommended is routine monthly testicular self-examination.  However if the patient has risk   factors for testicular cancer, evaluation and determination of an optimal follow-up strategy is deferred to urologist. (Reference: AJR 2016: [id number].)     2. Small left varicocele. A 3 mm calcification is noted in the left spermatic cord.      ASSESSMENT:     #1.  Orchialgia  2.  Erectile dysfunction  #3.  BPH    PLAN:   -Reviewed the findings and options of management with the patient  -Will try him on daily Cialis for his lower urinary tract symptoms  -He may also supplement with sildenafil 50 mg as needed  -Pelvic floor relaxation exercises were provided  -He also has done yoga in the past which he will resume  -[facility] [place]  -He can follow-up in 6 months    Thank you for allowing me to participate in this patients’ care.  Please do not hesitate to call with any additional questions.  Jeffrey Samaniego PA-C

## 2024-12-27 NOTE — PATIENT INSTRUCTIONS
Program for Pelvic Floor Relaxation   Dean Anderson MD,PhD        The pelvic floor is a group of muscles that sits at the very bottom of your urinary and digestive tracts.  Although these muscles must stay toned to prevent urinary incontinence (or leakage), they must also relax in order for us to urinate comfortably.   If you are receiving this handout, your urology provider believes that your pelvic floor could use some help.           This part of your body is a common source of urinary difficulty including:     Weak urinary stream, needing to push or strain to empty your bladder     Urinary frequency or urgency     Pain or discomfort during or after urination     You have probably been holding these muscles “tight” for a long time.  When this happens, your brain accepts this as normal.  The first step is to become aware of the tense muscles.  Then you can begin your practice to relax these muscles.       Although the other organs in your urinary tract can be addressed with medications or surgery (such as the bladder, or prostate in men), pelvic floor relaxation requires a different plan.  This is all about the mind-body connection!  In addition, the pelvic floor can become excessively tight with stress.       Relaxing your pelvic floor involves 3 steps: (1) training your body to identify these muscles, (2) relaxation exercises, and (3) mindfulness practices for stress reduction.     Practice for 10-30 minutes daily (or twice daily if needed).  The more you practice, the better you will feel.  As you become more aware of the muscles and how tense they are, relaxation will start to become automatic.  Be patient and keep trying until you are able to relax easily and quickly.     Please visit my website for a collection of carefully selected videos and other resources to guide your pelvic floor relaxation practice!       [email or web address]      You can  also access this website using this QR code:          Relaxation Techniques:     Find the pelvic floor muscles. Learn to make them contract and relax. First, look at the diagram of the pelvic floor muscles. The muscles sit like a hammock inside your pelvis. In the front, they start at the pubic bone, surround the vagina or penis and rectum, and attach in the back to your coccyx (tailbone). Picture the muscles in your mind. Imagine the muscles relaxing, hannah, and relaxing. Imagine how they would feel without pain or problems. Picture the muscles sagging down now, with your vagina or rectum (as a [place]) getting larger, and your sitting bones (the bones in your pelvis) .      Learn diaphragmatic breathing. This is a type of “deep breathing” to practice during all your pelvic relaxation exercises. First, take a slow, gentle breath in through your nose, and allow your belly and ribs to flare out to the sides. “Open” your pelvic floor with your inhale breath. Exhale slowly and gently through your mouth, allowing your belly to fall. Let the air out of your upper lungs, relax your ribs, belly and pelvic floor.     Try yoga!  Please visit my website above for an excellent youTube video on specific yoga exercises for pelvic floor relaxation.  Even if you have tried yoga before, this video describes easy/beginner yoga poses that specifically improve pelvic floor relaxation.     Incorporate mindfulness practices such as meditation.  Sit in a quiet room in a comfortable position.  Try a guided meditation on youtube, or the apps Calm or Headspace.  I recommend “body scan meditations” as particularly effective in aiding pelvic floor relaxation.      Relax your pelvic floor prior to urinating.  Once you have learned to target these muscles and relax them, make a habit of relaxing them before you urinate.  Being intentional to relax these muscles will allow you to urinate more comfortably.     Pay attention to  other areas of your body for signs of tension. Tension in other parts of your body may be making the tension in the pelvic floor muscles worse. Check various body parts (head, neck, shoulders, eyes, cheeks, jaw, arms, hands, legs, feet, ribs, belly, and buttocks) for tension. Now, gently release any area that feels tense. Allow yourself to entirely relax.       I hope you find this handout helpful!    Please consider taking a brief survey to help me learn how helpful these materials have been for you.               Wayne Anderson MD,PhD   [facility]   [phone]

## 2025-01-06 DIAGNOSIS — F98.8 ATTENTION DEFICIT DISORDER (ADD) WITHOUT HYPERACTIVITY: ICD-10-CM

## 2025-01-06 NOTE — TELEPHONE ENCOUNTER
Reason for call:   [x] Refill   [] Prior Auth  [] Other:     Office:   [x] PCP/Provider - [facility] - NUPUR Aguilar   [] Specialty/Provider -     Medication: Vyvanse 70 MG capsule     Dose/Frequency: Take 1 capsule (70 mg total) by mouth daily Max Daily Amount: 70 mg     Quantity: 30 capsule     Pharmacy: [facility]/pharmacy #[id number] [facility] [address] [address] [phone]    Does the patient have enough for 3 days?   [x] Yes   [] No - Send as HP to POD

## 2025-01-07 RX ORDER — LISDEXAMFETAMINE DIMESYLATE 70 MG/1
70 CAPSULE ORAL DAILY
Qty: 30 CAPSULE | Refills: 0 | Status: SHIPPED | OUTPATIENT
Start: 2025-01-07

## 2025-01-07 NOTE — TELEPHONE ENCOUNTER
Pt is overdue for f/u. Please schedule f/u with a PE within 1-2 months to avoid disruption in refills.

## 2025-02-18 DIAGNOSIS — F98.8 ATTENTION DEFICIT DISORDER (ADD) WITHOUT HYPERACTIVITY: ICD-10-CM

## 2025-02-18 RX ORDER — LISDEXAMFETAMINE DIMESYLATE 70 MG/1
70 CAPSULE ORAL DAILY
Qty: 30 CAPSULE | Refills: 0 | Status: SHIPPED | OUTPATIENT
Start: 2025-02-18

## 2025-04-01 ENCOUNTER — TELEPHONE (OUTPATIENT)
Dept: FAMILY MEDICINE CLINIC | Facility: HOSPITAL | Age: 45
End: 2025-04-01

## 2025-04-01 DIAGNOSIS — B00.1 HERPES LABIALIS: Primary | ICD-10-CM

## 2025-04-01 DIAGNOSIS — F98.8 ATTENTION DEFICIT DISORDER (ADD) WITHOUT HYPERACTIVITY: ICD-10-CM

## 2025-04-01 RX ORDER — ACYCLOVIR 50 MG/G
OINTMENT TOPICAL
Qty: 5 G | Refills: 0 | Status: SHIPPED | OUTPATIENT
Start: 2025-04-01

## 2025-04-01 RX ORDER — LISDEXAMFETAMINE DIMESYLATE 70 MG/1
70 CAPSULE ORAL DAILY
Qty: 30 CAPSULE | Refills: 0 | Status: SHIPPED | OUTPATIENT
Start: 2025-04-01

## 2025-04-01 NOTE — TELEPHONE ENCOUNTER
Pt is very overdue for f/u. He needs to be seen for further refills. Last refill of Vyvanse until seen.

## 2025-04-01 NOTE — TELEPHONE ENCOUNTER
Please contact patient if he needs to schedule an appointment before getting the refill.    Reason for call:   [x] Refill   [] Prior Auth  [] Other:     Office:   [x] PCP/Provider - NUPUR Aguilar   [] Specialty/Provider -     Medication: Vyvanse 70 MG capsule     Dose/Frequency: Take 1 capsule (70 mg total) by mouth daily Max Daily Amount: 70 mg     Quantity: 30    Pharmacy: [facility]/pharmacy #[id number] [facility] [place] [facility] [address] [facility]   Does the patient have enough for 3 days?   [] Yes   [x] No - Send as HP to POD    [facility] Away Pharmacy   Does the patient have enough for 10 days?   [] Yes   [] No - Send as HP to POD

## 2025-04-01 NOTE — TELEPHONE ENCOUNTER
I did send script for cold sore ointment since he does have an appointment scheduled. See also other task.

## 2025-04-01 NOTE — TELEPHONE ENCOUNTER
Unless the cold sore started in the last 48 hours there is nothing that can be prescribed that will help it. Has he tried any OTC meds like abreva?

## 2025-04-01 NOTE — TELEPHONE ENCOUNTER
Patient called the RX Refill Line. Message is being forwarded to the office.     Patient called to see if the doctor could prescribe something for a cold sore. He was not sure if he would need to come in to the office first. Please contact patient to discuss this further. Thank you.    Please contact patient at [phone]

## 2025-04-09 ENCOUNTER — OFFICE VISIT (OUTPATIENT)
Dept: FAMILY MEDICINE CLINIC | Facility: HOSPITAL | Age: 45
End: 2025-04-09
Payer: COMMERCIAL

## 2025-04-09 VITALS
SYSTOLIC BLOOD PRESSURE: 130 MMHG | DIASTOLIC BLOOD PRESSURE: 80 MMHG | TEMPERATURE: 97.8 F | WEIGHT: 281 LBS | HEART RATE: 68 BPM | OXYGEN SATURATION: 97 % | BODY MASS INDEX: 39.34 KG/M2 | HEIGHT: 71 IN

## 2025-04-09 DIAGNOSIS — Z00.00 ANNUAL PHYSICAL EXAM: Primary | ICD-10-CM

## 2025-04-09 DIAGNOSIS — J45.40 MODERATE PERSISTENT ASTHMA WITHOUT COMPLICATION: ICD-10-CM

## 2025-04-09 DIAGNOSIS — F98.8 ATTENTION DEFICIT DISORDER (ADD) WITHOUT HYPERACTIVITY: ICD-10-CM

## 2025-04-09 PROCEDURE — 99396 PREV VISIT EST AGE 40-64: CPT | Performed by: NURSE PRACTITIONER

## 2025-04-09 RX ORDER — MONTELUKAST SODIUM 10 MG/1
10 TABLET ORAL
Qty: 90 TABLET | Refills: 1 | Status: SHIPPED | OUTPATIENT
Start: 2025-04-09

## 2025-04-09 NOTE — PROGRESS NOTES
Adult Annual Physical  Name: Shade Mauro      : 1980      MRN: [id number]  Encounter Provider: NUPUR Aguilar  Encounter Date: 2025   Encounter department: [facility] [address]     :  Assessment & Plan  Annual physical exam         Moderate persistent asthma without complication  Controlled on current regimen.   Aware to start Symbicort with increased albuterol use.   F/U in 1 year.   Orders:    montelukast (SINGULAIR) 10 mg tablet; Take 1 tablet (10 mg total) by mouth daily at bedtime    Attention deficit disorder (ADD) without hyperactivity  Controlled.   Continue on current Vyvanse dose.   F/U in 1 year.        Annual physical exam               Preventive Screenings:  - Diabetes Screening: screening up-to-date  - Cholesterol Screening: screening up-to-date   - Hepatitis C screening: screening up-to-date   - HIV screening: screening up-to-date   - Colon cancer screening: screening not indicated   - Lung cancer screening: screening not indicated   - Prostate cancer screening: screening not indicated     Immunizations:  - Immunizations due: Influenza  - The patient declines recommended vaccines currently despite my recommendations      Counseling/Anticipatory Guidance:    - Diet: discussed recommendations for a healthy/well-balanced diet.   - Exercise: the importance of regular exercise/physical activity was discussed. Recommend exercise 3-5 times per week for at least 30 minutes.   - Injury prevention: discussed safety/seat belts, safety helmets, smoke detectors, carbon monoxide detectors, and smoking near bedding or upholstery.          History of Present Illness     Adult Annual Physical:  Patient presents for annual physical. Asthma is controlled. He just restarted Singulair. No albuterol use. Not using Symbicort.   No issues with Vyvanse. He does see a correlation with Vyvanse use and urinary stream. Vyvanse is effective. .     Diet and Physical Activity:  -  "Diet/Nutrition: no special diet.  - Exercise: walking.    Depression Screening:  - PHQ-2 Score: 0    General Health:  - Sleep: 7-8 hours of sleep on average and unrefreshing sleep.  - Hearing: tinnitus and normal hearing bilateral ears.  - Vision: wears glasses and most recent eye exam < 1 year ago.  - Dental: no dental visits for > 1 year, brushes teeth twice daily and floss regularly.     Health:    - Urinary symptoms: straining on urination.     Advanced Care Planning:  - Has an advanced directive?: no    - Has a durable medical POA?: no    - ACP document given to patient?: yes      Review of Systems   Constitutional: Negative.    HENT: Negative.     Eyes: Negative.    Respiratory: Negative.     Cardiovascular: Negative.    Gastrointestinal: Negative.    Endocrine: Negative.    Genitourinary:  Positive for difficulty urinating (at times). Negative for decreased urine volume, dysuria, enuresis, flank pain, frequency, genital sores, hematuria, penile discharge, penile pain, penile swelling, scrotal swelling, testicular pain and urgency.   Musculoskeletal: Negative.    Skin: Negative.    Allergic/Immunologic: Positive for environmental allergies.   Neurological: Negative.    Hematological: Negative.    Psychiatric/Behavioral: Negative.           Objective   /80 (Patient Position: Sitting, Cuff Size: Standard)   Pulse 68   Temp 97.8 °F (36.6 °C) (Tympanic)   Ht 5' 11\" (1.803 m)   Wt 127 kg (281 lb)   SpO2 97%   BMI 39.19 kg/m²     Physical Exam  Vitals reviewed.   Constitutional:       Appearance: Normal appearance. He is obese.   HENT:      Head: Normocephalic and atraumatic.      Right Ear: Tympanic membrane, ear canal and external ear normal.      Left Ear: Tympanic membrane, ear canal and external ear normal.      Nose: Nose normal.      Mouth/Throat:      Mouth: Mucous membranes are moist.      Pharynx: Oropharynx is clear.   Eyes:      Conjunctiva/sclera: Conjunctivae normal.      Pupils: Pupils " are equal, round, and reactive to light.   Neck:      Thyroid: No thyromegaly.   Cardiovascular:      Rate and Rhythm: Normal rate and regular rhythm.      Heart sounds: Normal heart sounds. No murmur heard.  Pulmonary:      Effort: Pulmonary effort is normal.      Breath sounds: Normal breath sounds.   Abdominal:      General: Abdomen is flat. Bowel sounds are normal.      Palpations: Abdomen is soft. There is no hepatomegaly or splenomegaly.      Tenderness: There is no abdominal tenderness.   Musculoskeletal:         General: Normal range of motion.      Cervical back: Normal range of motion and neck supple.   Skin:     General: Skin is warm and dry.      Capillary Refill: Capillary refill takes less than 2 seconds.   Neurological:      General: No focal deficit present.      Mental Status: He is alert and oriented to person, place, and time.   Psychiatric:         Mood and Affect: Mood normal.         Behavior: Behavior normal.         Thought Content: Thought content normal.         Judgment: Judgment normal.

## 2025-04-09 NOTE — PATIENT INSTRUCTIONS
"Patient Education     Routine physical for adults   The Basics   Written by the doctors and editors at [facility]   What is a physical? -- A physical is a routine visit, or \"check-up,\" with your doctor. You might also hear it called a \"wellness visit\" or \"preventive visit.\"  During each visit, the doctor will:   Ask about your physical and mental health   Ask about your habits, behaviors, and lifestyle   Do an exam   Give you vaccines if needed   Talk to you about any medicines you take   Give advice about your health   Answer your questions  Getting regular check-ups is an important part of taking care of your health. It can help your doctor find and treat any problems you have. But it's also important for preventing health problems.  A routine physical is different from a \"sick visit.\" A sick visit is when you see a doctor because of a health concern or problem. Since physicals are scheduled ahead of time, you can think about what you want to ask the doctor.  How often should I get a physical? -- It depends on your age and health. In general, for people age 21 years and older:   If you are younger than 50 years, you might be able to get a physical every 3 years.   If you are 50 years or older, your doctor might recommend a physical every year.  If you have an ongoing health condition, like diabetes or high blood pressure, your doctor will probably want to see you more often.  What happens during a physical? -- In general, each visit will include:   Physical exam - The doctor or nurse will check your height, weight, heart rate, and blood pressure. They will also look at your eyes and ears. They will ask about how you are feeling and whether you have any symptoms that bother you.   Medicines - It's a good idea to bring a list of all the medicines you take to each doctor visit. Your doctor will talk to you about your medicines and answer any questions. Tell them if you are having any side effects that bother you. You " "should also tell them if you are having trouble paying for any of your medicines.   Habits and behaviors - This includes:   Your diet   Your exercise habits   Whether you smoke, drink alcohol, or use drugs   Whether you are sexually active   Whether you feel safe at home  Your doctor will talk to you about things you can do to improve your health and lower your risk of health problems. They will also offer help and support. For example, if you want to quit smoking, they can give you advice and might prescribe medicines. If you want to improve your diet or get more physical activity, they can help you with this, too.   Lab tests, if needed - The tests you get will depend on your age and situation. For example, your doctor might want to check your:   Cholesterol   Blood sugar   Iron level   Vaccines - The recommended vaccines will depend on your age, health, and what vaccines you already had. Vaccines are very important because they can prevent certain serious or deadly infections.   Discussion of screening - \"Screening\" means checking for diseases or other health problems before they cause symptoms. Your doctor can recommend screening based on your age, risk, and preferences. This might include tests to check for:   Cancer, such as breast, prostate, cervical, ovarian, colorectal, prostate, lung, or skin cancer   Sexually transmitted infections, such as chlamydia and gonorrhea   Mental health conditions like depression and anxiety  Your doctor will talk to you about the different types of screening tests. They can help you decide which screenings to have. They can also explain what the results might mean.   Answering questions - The physical is a good time to ask the doctor or nurse questions about your health. If needed, they can refer you to other doctors or specialists, too.  Adults older than 65 years often need other care, too. As you get older, your doctor will talk to you about:   How to prevent falling at " home   Hearing or vision tests   Memory testing   How to take your medicines safely   Making sure that you have the help and support you need at home  All topics are updated as new evidence becomes available and our peer review process is complete.  This topic retrieved from [employer] on: May 02, 2024.  Topic 846430 Version 1.0  Release: 32.4.3 - C32.122  © 2024 UpToDate, Inc. and/or its affiliates. All rights reserved.  Consumer Information Use and Disclaimer   Disclaimer: This generalized information is a limited summary of diagnosis, treatment, and/or medication information. It is not meant to be comprehensive and should be used as a tool to help the user understand and/or assess potential diagnostic and treatment options. It does NOT include all information about conditions, treatments, medications, side effects, or risks that may apply to a specific patient. It is not intended to be medical advice or a substitute for the medical advice, diagnosis, or treatment of a health care provider based on the health care provider's examination and assessment of a patient's specific and unique circumstances. Patients must speak with a health care provider for complete information about their health, medical questions, and treatment options, including any risks or benefits regarding use of medications. This information does not endorse any treatments or medications as safe, effective, or approved for treating a specific patient. UpToDate, Inc. and its affiliates disclaim any warranty or liability relating to this information or the use thereof.The use of this information is governed by the Terms of Use, available at https://www.woltersIbottauwer.com/en/know/clinical-effectiveness-terms. 2024© UpToDate, Inc. and its affiliates and/or licensors. All rights reserved.  Copyright   © 2024 UpToDate, Inc. and/or its affiliates. All rights reserved.    Patient Education     Mediterranean diet   The Basics   Written by the doctors and  editors at [employer]   What is a Mediterranean diet? -- A Mediterranean diet is a heart-healthy way of eating. It includes foods and cooking styles from many countries around the [place], like [place] and [place]. The exact foods included vary from place to place.  A Mediterranean diet involves eating a lot of fruits, vegetables, nuts, and whole grains. It uses olive oil instead of other fats. It also includes some fish, poultry, and dairy products, but not a lot of red meat.  Wine is often thought of as part of a Mediterranean diet. It is not needed, and you might choose not to include it. If you do drink alcohol, limit the amount to:   For females, no more than 1 drink a day   For males, no more than 2 drinks a day  What are the benefits of a Mediterranean diet? -- A Mediterranean diet can help you:   Improve your overall health, and help you lose weight   Lower your risk of stroke   Lower your risk of heart problems such as a heart attack   Manage your blood sugar if you have diabetes  What can I eat and drink on a Mediterranean diet? -- A Mediterranean diet is more of an eating pattern than a strict diet. Try to cover two-thirds of your plate with fresh fruits and vegetables. Some examples of foods that are often part of this pattern:   Grains - Whole grains like whole-grain bread and pasta, oats, couscous, brown rice, barley, and orzo.   Fruits - Many kinds and colors of fresh, frozen, dried, or canned fruits. Frozen or canned fruits with 100% fruit juice or water (without added sugar). Examples include apples, pears, berries, melons, bananas, plums, raisins, figs, and peaches.   Vegetables - Many kinds and colors of fresh, frozen, or canned vegetables. If canned, low sodium or salt free. If frozen, without added fat and sodium. Examples include avocados, peppers, tomatoes, spinach, kale, beans, carrots, peas, olives, cucumbers, hummus, soybeans, lentils, and kidney beans.   Dairy - Low-fat milk, cheese,  and other dairy products. Greek yogurt, kefir, and plant-based milk alternatives like soy milk.   Lean meats, poultry, seafood, and proteins - [place], tuna, cod, and other fish. Shrimp, clams, scallops, and mussels. White meat chicken and turkey, eggs, dried beans, lentils, and tofu. Nuts such as walnuts, almonds, pecans, hazelnuts, cashews, peanuts, and nut butters. Seeds such as pumpkin, sesame, flax, and sunflower seeds.   Fats, oils, and other foods - Foods with healthy fats found in fish, nuts, and avocados. Olive oil or vegetable oils such as canola oil. Use onions, garlic, spices, and herbs to season food.  What foods and drinks should I avoid or limit on a Mediterranean diet? -- A Mediterranean diet involves avoiding or limiting certain types of foods. Try to avoid foods with additives like artificial sweeteners. Avoid foods that are processed, refined, or preserved. These are often foods with a very long shelf life.   Grains to avoid - White bread, pasta, white rice, crackers, and biscuits.   Fruits to avoid - Fruits canned or frozen with extra sugar.   Vegetables to avoid - Commercially prepared potatoes and vegetable mixes, regular canned vegetables and juices, and vegetables frozen with sauce or pickled vegetables.   Dairy to avoid - Whole-fat dairy products like cheese, ice cream, whole milk, cream, and buttermilk.   Lean meats, poultry, seafood, and proteins to avoid - Red meat such as beef, pork, and lamb. Processed meats such as sausages, deli meats, salami, hot dogs, and jeter.   Fats, oils, and other foods to avoid - Butter, margarine, lard, gravies, sauces, and salad dressing. Cookies, cakes, candy, doughnuts, muffins, and other sweets.  All topics are updated as new evidence becomes available and our peer review process is complete.  This topic retrieved from [employer] on: Apr 04, 2024.  Topic 534565 Version 2.0  Release: 32.2.4 - C32.93  © 2024 UpToDate, Inc. and/or its affiliates. All rights  reserved.  Consumer Information Use and Disclaimer   Disclaimer: This generalized information is a limited summary of diagnosis, treatment, and/or medication information. It is not meant to be comprehensive and should be used as a tool to help the user understand and/or assess potential diagnostic and treatment options. It does NOT include all information about conditions, treatments, medications, side effects, or risks that may apply to a specific patient. It is not intended to be medical advice or a substitute for the medical advice, diagnosis, or treatment of a health care provider based on the health care provider's examination and assessment of a patient's specific and unique circumstances. Patients must speak with a health care provider for complete information about their health, medical questions, and treatment options, including any risks or benefits regarding use of medications. This information does not endorse any treatments or medications as safe, effective, or approved for treating a specific patient. Up[employer]Date, Inc. and its affiliates disclaim any warranty or liability relating to this information or the use thereof.The use of this information is governed by the Terms of Use, available at https://www.woltersHigh Gear Mediauwer.com/en/know/clinical-effectiveness-terms. 2024© UpToDate, Inc. and its affiliates and/or licensors. All rights reserved.  Copyright   © 2024 UpToDate, Inc. and/or its affiliates. All rights reserved.

## 2025-04-09 NOTE — ASSESSMENT & PLAN NOTE
Controlled on current regimen.   Aware to start Symbicort with increased albuterol use.   F/U in 1 year.   Orders:    montelukast (SINGULAIR) 10 mg tablet; Take 1 tablet (10 mg total) by mouth daily at bedtime

## 2025-05-02 DIAGNOSIS — F98.8 ATTENTION DEFICIT DISORDER (ADD) WITHOUT HYPERACTIVITY: ICD-10-CM

## 2025-05-02 NOTE — TELEPHONE ENCOUNTER
Reason for call:   [x] Refill   [] Prior Auth  [] Other:     Office:   [x] PCP/Provider - hector Villaseñor  [] Specialty/Provider -     Medication:   Vyvanse 70 MG capsule    Dose/Frequency:   Sig: Take 1 capsule (70 mg total) by mouth daily Max Daily Amount: 70 mg    Quantity: 30    Pharmacy:   [facility]/pharmacy #[id number] [place] [address]  Phone: [phone]  Fax: [phone]    Local Pharmacy   Does the patient have enough for 3 days?   [] Yes   [x] No - Send as HP to POD    Mail Away Pharmacy   Does the patient have enough for 10 days?   [] Yes   [] No - Send as HP to POD

## 2025-05-05 RX ORDER — LISDEXAMFETAMINE DIMESYLATE 70 MG/1
70 CAPSULE ORAL DAILY
Qty: 30 CAPSULE | Refills: 0 | Status: SHIPPED | OUTPATIENT
Start: 2025-05-05

## 2025-06-13 DIAGNOSIS — F98.8 ATTENTION DEFICIT DISORDER (ADD) WITHOUT HYPERACTIVITY: ICD-10-CM

## 2025-06-13 RX ORDER — LISDEXAMFETAMINE DIMESYLATE 70 MG/1
70 CAPSULE ORAL DAILY
Qty: 30 CAPSULE | Refills: 0 | Status: SHIPPED | OUTPATIENT
Start: 2025-06-13

## 2025-06-13 NOTE — TELEPHONE ENCOUNTER
Reason for call:   [x] Refill   [] Prior Auth  [] Other:     Office:   [x] PCP/Provider - NUPUR Aguilar / [facility]   [] Specialty/Provider -     Medication: Vyvanse 70 MG capsule     Dose/Frequency: Take 1 capsule (70 mg total) by mouth daily     Quantity: 30    Pharmacy: [facility]/pharmacy #[id number] [place], [place] - [address] [facility]   Does the patient have enough for 3 days?   [x] Yes   [] No - Send as HP to POD    Mail Away Pharmacy   Does the patient have enough for 10 days?   [] Yes   [] No - Send as HP to POD

## 2025-06-20 ENCOUNTER — OFFICE VISIT (OUTPATIENT)
Age: 45
End: 2025-06-20
Payer: COMMERCIAL

## 2025-06-20 VITALS
WEIGHT: 275 LBS | HEART RATE: 73 BPM | TEMPERATURE: 97.4 F | DIASTOLIC BLOOD PRESSURE: 60 MMHG | HEIGHT: 71 IN | BODY MASS INDEX: 38.5 KG/M2 | OXYGEN SATURATION: 99 % | SYSTOLIC BLOOD PRESSURE: 120 MMHG

## 2025-06-20 DIAGNOSIS — R39.11 BENIGN PROSTATIC HYPERPLASIA WITH URINARY HESITANCY: Primary | ICD-10-CM

## 2025-06-20 DIAGNOSIS — B00.1 HERPES LABIALIS: Primary | ICD-10-CM

## 2025-06-20 DIAGNOSIS — N40.1 BENIGN PROSTATIC HYPERPLASIA WITH URINARY HESITANCY: Primary | ICD-10-CM

## 2025-06-20 LAB
SL AMB  POCT GLUCOSE, UA: NORMAL
SL AMB LEUKOCYTE ESTERASE,UA: NORMAL
SL AMB POCT BILIRUBIN,UA: NORMAL
SL AMB POCT BLOOD,UA: NORMAL
SL AMB POCT CLARITY,UA: CLEAR
SL AMB POCT COLOR,UA: YELLOW
SL AMB POCT KETONES,UA: NORMAL
SL AMB POCT NITRITE,UA: NORMAL
SL AMB POCT PH,UA: 6
SL AMB POCT SPECIFIC GRAVITY,UA: 1.01
SL AMB POCT URINE PROTEIN: NORMAL
SL AMB POCT UROBILINOGEN: NORMAL

## 2025-06-20 PROCEDURE — 81002 URINALYSIS NONAUTO W/O SCOPE: CPT | Performed by: PHYSICIAN ASSISTANT

## 2025-06-20 PROCEDURE — 99213 OFFICE O/P EST LOW 20 MIN: CPT | Performed by: PHYSICIAN ASSISTANT

## 2025-06-20 RX ORDER — VALACYCLOVIR HYDROCHLORIDE 1 G/1
2000 TABLET, FILM COATED ORAL 2 TIMES DAILY
Qty: 4 TABLET | Refills: 2 | Status: SHIPPED | OUTPATIENT
Start: 2025-06-20 | End: 2025-06-21

## 2025-06-20 NOTE — PROGRESS NOTES
Name: Shade Mauro      : 1980      MRN: [id number]  Encounter Provider: Jeffrey Samaniego PA-C  Encounter Date: 2025   Encounter department: [facility]  :  Assessment & Plan  Benign prostatic hyperplasia with urinary hesitancy    Orders:    POCT urine dip  Follow-up in 1 year for his annual exam      History of Present Illness   Shade Mauro is a 44 y.o. male who presents history of BPH and erectile dysfunction.  Difficulty starting the stream and maintaining an erection.  I tried him on daily Cialis with sildenafil supplemental 50 mg as needed.  Pelvic floor relaxation exercises.  Last PSA was 2.00.  Feeling much better most of his symptoms have resolved.    Review of Systems       Objective   There were no vitals taken for this visit.    Physical Exam GEN: no acute distress    RESP: breathing comfortably with no accessory muscle use    ABD: soft, non-tender, non-distended   INCISION:    EXT: no significant peripheral edema     RADIOLOGY:   None       Results   Lab Results   Component Value Date    PSA 2.004 2024     Lab Results   Component Value Date    CALCIUM 9.1 2024    K 3.9 2024    CO2 29 2024     2024    BUN 14 2024    CREATININE 0.92 2024     Lab Results   Component Value Date    WBC 7.92 2024    HGB 15.3 2024    HCT 45.5 2024    MCV 92 2024     2024       Office Urine Dip  No results found for this or any previous visit (from the past hour).

## 2025-07-18 DIAGNOSIS — F98.8 ATTENTION DEFICIT DISORDER (ADD) WITHOUT HYPERACTIVITY: ICD-10-CM

## 2025-07-18 DIAGNOSIS — B00.1 HERPES LABIALIS: ICD-10-CM

## 2025-07-18 NOTE — TELEPHONE ENCOUNTER
- Patient states the pharmacy never received the Valacyclovir    Reason for call:   [x] Refill   [] Prior Auth  [] Other:     Office:   [x] PCP/Provider -   [] Specialty/Provider -     Medication:   - Vyvanse 70 mg- take 1 capsule by mouth daily   - Valacyclovir 1,000 mg- take 2 tablets by mouth 2 times a day for 1 day      Pharmacy: [facility]    Local Pharmacy   Does the patient have enough for 3 days?   [x] Yes   [] No - Send as HP to POD    Mail Away Pharmacy   Does the patient have enough for 10 days?   [] Yes   [] No - Send as HP to POD

## 2025-07-21 RX ORDER — VALACYCLOVIR HYDROCHLORIDE 1 G/1
2000 TABLET, FILM COATED ORAL 2 TIMES DAILY
Qty: 4 TABLET | Refills: 0 | Status: SHIPPED | OUTPATIENT
Start: 2025-07-21 | End: 2025-07-22

## 2025-07-21 RX ORDER — LISDEXAMFETAMINE DIMESYLATE 70 MG/1
70 CAPSULE ORAL DAILY
Qty: 30 CAPSULE | Refills: 0 | Status: SHIPPED | OUTPATIENT
Start: 2025-07-21 | End: 2025-07-22 | Stop reason: SDUPTHER

## 2025-07-22 ENCOUNTER — TELEPHONE (OUTPATIENT)
Dept: FAMILY MEDICINE CLINIC | Facility: HOSPITAL | Age: 45
End: 2025-07-22

## 2025-07-22 DIAGNOSIS — F98.8 ATTENTION DEFICIT DISORDER (ADD) WITHOUT HYPERACTIVITY: ICD-10-CM

## 2025-07-22 RX ORDER — LISDEXAMFETAMINE DIMESYLATE 70 MG/1
70 CAPSULE ORAL DAILY
Qty: 30 CAPSULE | Refills: 0 | Status: SHIPPED | OUTPATIENT
Start: 2025-07-22